# Patient Record
Sex: FEMALE | Race: WHITE | NOT HISPANIC OR LATINO | Employment: OTHER | URBAN - METROPOLITAN AREA
[De-identification: names, ages, dates, MRNs, and addresses within clinical notes are randomized per-mention and may not be internally consistent; named-entity substitution may affect disease eponyms.]

---

## 2017-04-11 ENCOUNTER — OFFICE VISIT (OUTPATIENT)
Dept: URGENT CARE | Facility: CLINIC | Age: 22
End: 2017-04-11
Payer: OTHER MISCELLANEOUS

## 2017-04-11 VITALS
HEART RATE: 102 BPM | OXYGEN SATURATION: 98 % | TEMPERATURE: 99.3 F | WEIGHT: 127 LBS | DIASTOLIC BLOOD PRESSURE: 78 MMHG | HEIGHT: 63 IN | RESPIRATION RATE: 14 BRPM | BODY MASS INDEX: 22.5 KG/M2 | SYSTOLIC BLOOD PRESSURE: 128 MMHG

## 2017-04-11 DIAGNOSIS — H73.012 BULLOUS MYRINGITIS OF LEFT EAR: ICD-10-CM

## 2017-04-11 PROCEDURE — 99214 OFFICE O/P EST MOD 30 MIN: CPT | Performed by: PHYSICIAN ASSISTANT

## 2017-04-11 RX ORDER — PREDNISONE 20 MG/1
TABLET ORAL
Qty: 3 TAB | Refills: 0 | Status: SHIPPED | OUTPATIENT
Start: 2017-04-11 | End: 2018-06-12

## 2017-04-11 RX ORDER — AMOXICILLIN AND CLAVULANATE POTASSIUM 875; 125 MG/1; MG/1
1 TABLET, FILM COATED ORAL 2 TIMES DAILY
Qty: 20 TAB | Refills: 0 | Status: SHIPPED | OUTPATIENT
Start: 2017-04-11 | End: 2017-04-21

## 2017-04-11 ASSESSMENT — ENCOUNTER SYMPTOMS
CARDIOVASCULAR NEGATIVE: 1
HEARTBURN: 0
NEUROLOGICAL NEGATIVE: 1
NAUSEA: 0
EYES NEGATIVE: 1
NECK PAIN: 0
RESPIRATORY NEGATIVE: 1
PSYCHIATRIC NEGATIVE: 1
VOMITING: 0

## 2017-04-11 NOTE — PATIENT INSTRUCTIONS
Start the prednisone today (steroid). It's best to take this in the morning.  While on prednisone avoid NSAIDs (aleve, ibuprofen, advil).  Tylenol is OK.   Start the Augmentin (antibiotic) and take morning and evening.  Start now and again tonight.  Increase yogurt and probiotics to avoid diarrhea and yeast infections.  Nasal saline irrigation (netti pot or Bharat Med Sinus Rinse).  Used distilled water or boiled tap water with nasal flushes, not straight tap water.  Humidifier at bedtime.  Hot steam showers to loosen up mucous.  Cough medicine at bedtime.  Lots of fluids, tea with honey.    Otitis Media, Adult  Otitis media is redness, soreness, and inflammation of the middle ear. Otitis media may be caused by allergies or, most commonly, by infection. Often it occurs as a complication of the common cold.  SIGNS AND SYMPTOMS  Symptoms of otitis media may include:  · Earache.  · Fever.  · Ringing in your ear.  · Headache.  · Leakage of fluid from the ear.  DIAGNOSIS  To diagnose otitis media, your health care provider will examine your ear with an otoscope. This is an instrument that allows your health care provider to see into your ear in order to examine your eardrum. Your health care provider also will ask you questions about your symptoms.  TREATMENT   Typically, otitis media resolves on its own within 3-5 days. Your health care provider may prescribe medicine to ease your symptoms of pain. If otitis media does not resolve within 5 days or is recurrent, your health care provider may prescribe antibiotic medicines if he or she suspects that a bacterial infection is the cause.  HOME CARE INSTRUCTIONS   · If you were prescribed an antibiotic medicine, finish it all even if you start to feel better.  · Take medicines only as directed by your health care provider.  · Keep all follow-up visits as directed by your health care provider.  SEEK MEDICAL CARE IF:  · You have otitis media only in one ear, or bleeding from your  nose, or both.  · You notice a lump on your neck.  · You are not getting better in 3-5 days.  · You feel worse instead of better.  SEEK IMMEDIATE MEDICAL CARE IF:   · You have pain that is not controlled with medicine.  · You have swelling, redness, or pain around your ear or stiffness in your neck.  · You notice that part of your face is paralyzed.  · You notice that the bone behind your ear (mastoid) is tender when you touch it.  MAKE SURE YOU:   · Understand these instructions.  · Will watch your condition.  · Will get help right away if you are not doing well or get worse.     This information is not intended to replace advice given to you by your health care provider. Make sure you discuss any questions you have with your health care provider.     Document Released: 09/22/2005 Document Revised: 01/08/2016 Document Reviewed: 07/15/2014  Elsevier Interactive Patient Education ©2016 Elsevier Inc.

## 2017-04-11 NOTE — Clinical Note
April 11, 2017         Patient: Lester Chávez   YOB: 1995   Date of Visit: 4/11/2017           To Whom it May Concern:    Lester Chávez was seen in my clinic on 4/11/2017. Please excuse her from classes and exams 4/11-4/13.    If you have any questions or concerns, please don't hesitate to call.        Sincerely,           Eric Villagomez PA-C  Electronically Signed

## 2017-04-11 NOTE — PROGRESS NOTES
Subjective:      Lester Chávez is a 21 y.o. female who presents with Congestion            HPI  Chief Complaint   Patient presents with   • Congestion     x 1 week/ mucus/and left ear plugged up/cough/stuffy nose       HPI:  Lester Chávez is a 21 y.o. female who presents with 1 week of worsening ear pain and congestion.  Left ear plugged up.  Has tried mucinex and sinus pills with sort of improvement.  Having dizziness with laying backwards.   Feeling uncomfortable.   Woke up last night with bad ear pain.  No fever or chills.  Green to yellow nose mucous.  No sick contacts.  Patient denies HA, SOB, chest pain, palpitations, fever, chills, or n/v/d.      No past medical history on file.    No past surgical history on file.    No family history on file.    Social History     Social History   • Marital Status: Unknown     Spouse Name: N/A   • Number of Children: N/A   • Years of Education: N/A     Occupational History   • Not on file.     Social History Main Topics   • Smoking status: Former Smoker   • Smokeless tobacco: Not on file   • Alcohol Use: Yes   • Drug Use: Not on file   • Sexual Activity: Not on file     Other Topics Concern   • Not on file     Social History Narrative         Current outpatient prescriptions:   •  amphetamine-dextroamphetamine, 30 mg, Oral, BID, 4/11/2017  •  Norethindrone Acet-Ethinyl Est (JUNEL 1.5/30 PO), Take  by mouth., 4/11/2017  •  amoxicillin, 875 mg, Oral, BID, not taking    No Known Allergies       Review of Systems   Constitutional: Positive for malaise/fatigue.   HENT: Positive for congestion and ear pain.    Eyes: Negative.    Respiratory: Negative.    Cardiovascular: Negative.    Gastrointestinal: Negative for heartburn, nausea and vomiting.   Genitourinary: Negative.    Musculoskeletal: Negative for neck pain.   Neurological: Negative.    Endo/Heme/Allergies: Negative.    Psychiatric/Behavioral: Negative.    All other systems reviewed and are negative.         Objective:  "    /78 mmHg  Pulse 102  Temp(Src) 37.4 °C (99.3 °F)  Resp 14  Ht 1.6 m (5' 2.99\")  Wt 57.607 kg (127 lb)  BMI 22.50 kg/m2  SpO2 98%  LMP 03/28/2017  Breastfeeding? No     Physical Exam       Nursing note and vitals reviewed.    Constitutional:   Appropriately groomed, pleasant affect, well nourished, in NAD.    Head:   Normocephalic, atraumatic.    Eyes:   PERRLA, EOM's full, sclera white, conjunctiva not erythematous, and medial canthus without exudate bilaterally.    Ears:  Tragus tender to manipulation.  No pre-auricular lymphadenopathy or mastoid ttp.  EACs with mild cerumen bilaterally, not erythematous.  Right TM bulging and injected with loss of anatomical landmarks with multiple bullae. Superior aspect of capillary hemorrhage.  Mucopurulent fluid present in the inner ear.  No apparent perforation.  Left TM pearly gray with cone of light present and umbo and malleolus visible.  No bulging or fluid bubbles present in middle ear.  Hearing grossly intact to voice.    Nose:  Nares bilaterally.  Nasal mucosa not edematous. Sinuses not tender to percussion bilaterally.    Throat:  Dentition wnl, mucosa moist without lesions.  Oropharynx mildly erythematous, with no enlargement of the palatine tonsils bilaterally with no exudates.    Post nasal drainage present.  Soft palate rises symmetrically bilaterally and uvula midline.      Neck: Neck supple, with mild anterior lymphadenopathy that is soft and mobile to palpation. Thyroid non-palpable without tenderness or nodules. No supraclavicular lymphadenopathy.    Lungs:  Respiratory effort not labored without accessory muscle use.  Lungs clear to auscultation bilaterally without wheezes or rales. Rhonchi clear to cough.    Heart:  RRR, without murmurs rubs or gallops.  Radial and dorsalis pedis pulse 2+ bilaterally.  No LE edema.    Musculoskeletal:  Gait non-antalgic with a narrow base.    Derm:  Skin without rashes or lesions with good turgor pressure. "      Psychiatric:  Mood, affect, and judgement appropriate.           Assessment/Plan:     1. Bullous myringitis of left ear  Patient presents with significant otitis media of the left air with bullous myringitis with bullae and capillary hemorrhage. No apparent perforation at this time. No mastoid tenderness to palpation. Anterior cervical chain lymphadenopathy palpation was coryza present bilaterally. Suspect viral upper respiratory infection on top of left bacterial ear infection. Prescribed Augmentin given the severity of symptoms and a prednisone pulse ×3 days. Recommended nasal saline irrigation and over-the-counter ear drop analgesic. Advised patient follow-up in 5-6 days for reevaluation of TM. School note provided.    Patient was in agreement with this treatment plan and seemed to understand without barriers. All questions were encouraged and answered.  Reviewed signs and symptoms of when to seek emergency medical care.     Please note that this dictation was created using voice recognition software.  I have made every reasonable attempt to correct obvious errors, but I expect there are errors of mayra and possibly content that I did not discover before finalizing the note.     - predniSONE (DELTASONE) 20 MG Tab; 1 tablet PO daily x 3 days.  Take with food in the morning.  Dispense: 3 Tab; Refill: 0  - amoxicillin-clavulanate (AUGMENTIN) 875-125 MG Tab; Take 1 Tab by mouth 2 times a day for 10 days.  Dispense: 20 Tab; Refill: 0

## 2017-04-21 ENCOUNTER — HOSPITAL ENCOUNTER (EMERGENCY)
Facility: MEDICAL CENTER | Age: 22
End: 2017-04-21
Attending: EMERGENCY MEDICINE
Payer: OTHER MISCELLANEOUS

## 2017-04-21 VITALS
WEIGHT: 125.66 LBS | HEIGHT: 63 IN | OXYGEN SATURATION: 98 % | TEMPERATURE: 98.1 F | BODY MASS INDEX: 22.27 KG/M2 | RESPIRATION RATE: 16 BRPM | HEART RATE: 99 BPM | DIASTOLIC BLOOD PRESSURE: 77 MMHG | SYSTOLIC BLOOD PRESSURE: 118 MMHG

## 2017-04-21 DIAGNOSIS — H67.9: ICD-10-CM

## 2017-04-21 PROCEDURE — A9270 NON-COVERED ITEM OR SERVICE: HCPCS | Performed by: EMERGENCY MEDICINE

## 2017-04-21 PROCEDURE — 700102 HCHG RX REV CODE 250 W/ 637 OVERRIDE(OP): Performed by: EMERGENCY MEDICINE

## 2017-04-21 PROCEDURE — 99283 EMERGENCY DEPT VISIT LOW MDM: CPT

## 2017-04-21 RX ORDER — AMOXICILLIN AND CLAVULANATE POTASSIUM 875; 125 MG/1; MG/1
1 TABLET, FILM COATED ORAL ONCE
Status: COMPLETED | OUTPATIENT
Start: 2017-04-21 | End: 2017-04-21

## 2017-04-21 RX ORDER — AMOXICILLIN AND CLAVULANATE POTASSIUM 875; 125 MG/1; MG/1
1 TABLET, FILM COATED ORAL 2 TIMES DAILY
Qty: 14 TAB | Refills: 0 | Status: SHIPPED | OUTPATIENT
Start: 2017-04-21 | End: 2018-06-12

## 2017-04-21 RX ORDER — DEXTROAMPHETAMINE SACCHARATE, AMPHETAMINE ASPARTATE, DEXTROAMPHETAMINE SULFATE AND AMPHETAMINE SULFATE 7.5; 7.5; 7.5; 7.5 MG/1; MG/1; MG/1; MG/1
30 TABLET ORAL DAILY
COMMUNITY
End: 2018-06-12

## 2017-04-21 RX ORDER — IBUPROFEN 800 MG/1
800 TABLET ORAL EVERY 8 HOURS PRN
Qty: 30 TAB | Refills: 0 | Status: SHIPPED | OUTPATIENT
Start: 2017-04-21 | End: 2018-06-12

## 2017-04-21 RX ORDER — IBUPROFEN 600 MG/1
600 TABLET ORAL ONCE
Status: COMPLETED | OUTPATIENT
Start: 2017-04-21 | End: 2017-04-21

## 2017-04-21 RX ADMIN — IBUPROFEN 600 MG: 600 TABLET, FILM COATED ORAL at 08:16

## 2017-04-21 RX ADMIN — AMOXICILLIN AND CLAVULANATE POTASSIUM 1 TABLET: 875; 125 TABLET, FILM COATED ORAL at 08:16

## 2017-04-21 ASSESSMENT — ENCOUNTER SYMPTOMS
FEVER: 0
CHILLS: 0

## 2017-04-21 ASSESSMENT — PAIN SCALES - GENERAL: PAINLEVEL_OUTOF10: 6

## 2017-04-21 NOTE — ED AVS SNAPSHOT
4/21/2017    [facility] [place] [address]    Dear [facility]:    [facility] wants to ensure your discharge home is safe and you or your loved ones have had all of your questions answered regarding your care after you leave the hospital.    Below is a list of resources and contact information should you have any questions regarding your hospital stay, follow-up instructions, or active medical symptoms.    Questions or Concerns Regarding… Contact   Medical Questions Related to Your Discharge  (7 days a week, 8am-5pm) Contact a Nurse Care Coordinator   [phone]   Medical Questions Not Related to Your Discharge  (24 hours a day / 7 days a week)  Contact the Nurse Health Line   [phone]    Medications or Discharge Instructions Refer to your discharge packet   or contact your [facility] Primary Care Provider   [phone]   Follow-up Appointment(s) Schedule your appointment via [employer]   or contact Scheduling [phone]   Billing Review your statement via [employer]  or contact Billing [phone]   Medical Records Review your records via [employer]   or contact Medical Records [phone]     You may receive a telephone call within two days of discharge. This call is to make certain you understand your discharge instructions and have the opportunity to have any questions answered. You can also easily access your medical information, test results and upcoming appointments via the [employer] free online health management tool. You can learn more and sign up at [employer]. For assistance setting up your [employer] account, please call [phone].    Once again, we want to ensure your discharge home is safe and that you have a clear understanding of any next steps in your care. If you have any questions or concerns, please do not hesitate to contact us, we are here for you. Thank you for choosing [facility] for your healthcare needs.    Sincerely,    Your [facility] Healthcare Team

## 2017-04-21 NOTE — ED AVS SNAPSHOT
Home Care Instructions                                                                                                                Lester Chávez   MRN: [id number]    Department:  [facility], Emergency Dept   Date of Visit:  4/21/2017            [facility], Emergency Dept    [address]    [place] [address]    Phone:  [phone]      You were seen by     Austin Lockett D.O.      Your Diagnosis Was     Otitis media in disease classified elsewhere     H67.9       Follow-up Information     1. Follow up with [facility].    Contact information    [address]  [address]  [place] [address] [phone]        Medication Information     Review all of your home medications and newly ordered medications with your primary doctor and/or pharmacist as soon as possible. Follow medication instructions as directed by your doctor and/or pharmacist.     Please keep your complete medication list with you and share with your physician. Update the information when medications are discontinued, doses are changed, or new medications (including over-the-counter products) are added; and carry medication information at all times in the event of emergency situations.               Medication List      START taking these medications        Instructions    Morning Afternoon Evening Bedtime    amoxicillin-clavulanate 875-125 MG Tabs   Commonly known as:  AUGMENTIN        Take 1 Tab by mouth 2 times a day.   Dose:  1 Tab                        ibuprofen 800 MG Tabs   Commonly known as:  MOTRIN        Take 1 Tab by mouth every 8 hours as needed.   Dose:  800 mg                          ASK your doctor about these medications        Instructions    Morning Afternoon Evening Bedtime    ADDERALL (30MG) 30 MG tablet   Generic drug:  amphetamine-dextroamphetamine        Take 30 mg by mouth every day.   Dose:  30 mg                        NON SPECIFIED        ORAL BIRTH CONTROL                                Where to Get Your Medications      You can get these medications from any pharmacy     Bring a paper prescription for each of these medications    - amoxicillin-clavulanate 875-125 MG Tabs  - ibuprofen 800 MG Tabs              Discharge Instructions       Otitis Media, Adult  Otitis media is redness, soreness, and inflammation of the middle ear. Otitis media may be caused by allergies or, most commonly, by infection. Often it occurs as a complication of the common cold.  SIGNS AND SYMPTOMS  Symptoms of otitis media may include:  · Earache.  · Fever.  · Ringing in your ear.  · Headache.  · Leakage of fluid from the ear.  DIAGNOSIS  To diagnose otitis media, your health care provider will examine your ear with an otoscope. This is an instrument that allows your health care provider to see into your ear in order to examine your eardrum. Your health care provider also will ask you questions about your symptoms.  TREATMENT   Typically, otitis media resolves on its own within 3-5 days. Your health care provider may prescribe medicine to ease your symptoms of pain. If otitis media does not resolve within 5 days or is recurrent, your health care provider may prescribe antibiotic medicines if he or she suspects that a bacterial infection is the cause.  HOME CARE INSTRUCTIONS   · If you were prescribed an antibiotic medicine, finish it all even if you start to feel better.  · Take medicines only as directed by your health care provider.  · Keep all follow-up visits as directed by your health care provider.  SEEK MEDICAL CARE IF:  · You have otitis media only in one ear, or bleeding from your nose, or both.  · You notice a lump on your neck.  · You are not getting better in 3-5 days.  · You feel worse instead of better.  SEEK IMMEDIATE MEDICAL CARE IF:   · You have pain that is not controlled with medicine.  · You have swelling, redness, or pain around your ear or stiffness in your neck.  · You notice that part of your face  is paralyzed.  · You notice that the bone behind your ear (mastoid) is tender when you touch it.  MAKE SURE YOU:   · Understand these instructions.  · Will watch your condition.  · Will get help right away if you are not doing well or get worse.     This information is not intended to replace advice given to you by your health care provider. Make sure you discuss any questions you have with your health care provider.     Document Released: 09/22/2005 Document Revised: 01/08/2016 Document Reviewed: 07/15/2014  ElseRIWI Interactive Patient Education ©2016 [employer] Inc.            Patient Information     Patient Information    Following emergency treatment: all patient requiring follow-up care must return either to a private physician or a clinic if your condition worsens before you are able to obtain further medical attention, please return to the emergency room.     Billing Information    At [facility], we work to make the billing process streamlined for our patients.  Our Representatives are here to answer any questions you may have regarding your hospital bill.  If you have insurance coverage and have supplied your insurance information to us, we will submit a claim to your insurer on your behalf.  Should you have any questions regarding your bill, we can be reached online or by phone as follows:  Online: You are able pay your bills online or live chat with our representatives about any billing questions you may have. We are here to help Monday - Friday from 8:00am to 7:30pm and 9:00am - 12:00pm on Saturdays.  Please visit [email or web address]  for more information.   Phone:  [phone] or [phone]    Please note that your emergency physician, surgeon, pathologist, radiologist, anesthesiologist, and other specialists are not employed by [facility] and will therefore bill separately for their services.  Please contact them directly for any questions concerning their bills at  the numbers below:     Emergency Physician Services:  [phone]  [facility]:  [phone]  [facility]:  [phone]  [facility]:  [phone]    1. Your final bill may vary from the amount quoted upon discharge if all procedures are not complete at that time, or if your doctor has additional procedures of which we are not aware. You will receive an additional bill if you return to the Emergency Department at [facility] for suture removal regardless of the facility of which the sutures were placed.     2. Please arrange for settlement of this account at the emergency registration.    3. All self-pay accounts are due in full at the time of treatment.  If you are unable to meet this obligation then payment is expected within 4-5 days.     4. If you have had radiology studies (CT, X-ray, Ultrasound, MRI), you have received a preliminary result during your emergency department visit. Please contact the radiology department [phone] to receive a copy of your final result. Please discuss the Final result with your primary physician or with the follow up physician provided.     Crisis Hotline:  [place] Crisis Hotline:  2-255-UEUTCTH or 1-754.560.8131  [place] Crisis Hotline:    1-181.639.7834 or 831-123-1673         ED Discharge Follow Up Questions    1. In order to provide you with very good care, we would like to follow up with a phone call in the next few days.  May we have your permission to contact you?     YES /  NO    2. What is the best phone number to call you? (       )_____-__________    3. What is the best time to call you?      Morning  /  Afternoon  /  Evening                   Patient Signature:  ____________________________________________________________    Date:  ____________________________________________________________

## 2017-04-21 NOTE — ED PROVIDER NOTES
"ED Provider Note    Scribed for Austin Lockett D.O. by Layla Beebe. 4/21/2017  7:58 AM    Primary care provider: No primary care provider  Means of arrival: Private vehicle  History obtained from: Patient  History limited by: None    CHIEF COMPLAINT  Chief Complaint   Patient presents with   • Ear Pain     rt   • Congestion     HPI  Lester Chávez is a 21 y.o. female who presents to the Emergency Department for right ear pain onset 4 hours prior to examination.  She states that the pain is very achy and 6/10 severity. The patient denies any fever or chills. The patient notes that she was seen at [facility] and treated for a left ear infection with a 10 day course of antibiotics which she just finished two days ago. The patient was also treated with a 3 day course of steroids for her congestion.  She notes that she has been feeling sick and congested for the last three weeks as well.     REVIEW OF SYSTEMS  Review of Systems   Constitutional: Negative for fever and chills.   HENT: Positive for congestion and ear pain (right).    E    PAST MEDICAL HISTORY    No chronic medical problems    SURGICAL HISTORY   has past surgical history that includes other cardiac surgery.    SOCIAL HISTORY  Social History   Substance Use Topics   • Smoking status: Never Smoker    • Smokeless tobacco: None   • Alcohol Use: No      History   Drug Use   • Yes     Comment: pot occ     FAMILY HISTORY  History reviewed. No pertinent family history.    CURRENT MEDICATIONS  Home Medications     Reviewed by Edgar Contreras R.N. (Registered Nurse) on 04/21/17 at 0723  Med List Status: Partial    Medication Last Dose Status    amphetamine-dextroamphetamine (ADDERALL, 30MG,) 30 MG tablet 4/20/2017 Active    NON SPECIFIED 4/20/2017 Active              ALLERGIES  No Known Allergies    PHYSICAL EXAM  VITAL SIGNS: /77 mmHg  Pulse 99  Temp(Src) 36.7 °C (98.1 °F)  Resp 16  Ht 1.6 m (5' 3\")  Wt 57 kg (125 lb 10.6 oz) " " BMI 22.27 kg/m2  SpO2 98%  LMP 03/31/2017    Constitutional: No distress. Well nourished.  HENT: Head is atraumatic. Oropharynx is moist. Right TM with erythema and bulging Left TM normal  Eyes: Conjunctivae are normal. EOMI.   Respiratory: No respiratory distress. Equal chest expansion.   Musculoskeletal: Normal range of motion. No edema.   Neurological: Alert. No focal deficits noted.    Skin: No rash. No Pallor.   Psych: Appropriate for clinical situation. Normal affect.    COURSE & MEDICAL DECISION MAKING  Nursing notes, VS, PMSFHx reviewed in chart.    7:58 AM - Patient seen and examined at bedside. Patient will be treated with Motrin 600mg tablet, Augmentin 875-125mg tablet.I discussed her above findings and plans for discharge with a prescription for 875-125mg, Motrin 800mg tablet. She was given a referral to follow up with her primary care provider and instructed to return to the ED if her symptoms worsen. She is nontoxic appearing, comfortable,and will follow up.  Patient understands and agrees. Her vitals prior to discharge are: /77 mmHg  Pulse 99  Temp(Src) 36.7 °C (98.1 °F)  Resp 16  Ht 1.6 m (5' 3\")  Wt 57 kg (125 lb 10.6 oz)  BMI 22.27 kg/m2  SpO2 98%  LMP 03/31/2017    The patient will return for new or worsening symptoms and is stable at the time of discharge.    DISPOSITION:  Patient will be discharged home in stable condition.    FOLLOW UP:  [facility]  [facility]  [place] [address]  [phone]            OUTPATIENT MEDICATIONS:  Discharge Medication List as of 4/21/2017  8:13 AM      START taking these medications    Details   amoxicillin-clavulanate (AUGMENTIN) 875-125 MG Tab Take 1 Tab by mouth 2 times a day., Disp-14 Tab, R-0, Print Rx Paper      ibuprofen (MOTRIN) 800 MG Tab Take 1 Tab by mouth every 8 hours as needed., Disp-30 Tab, R-0, Print Rx Paper             FINAL IMPRESSION  1. Otitis media in disease classified elsewhere          ILayla " Concepción (Scribe), am scribing for, and in the presence of, Austin Lockett D.O..    Electronically signed by: Layla Beebe ([email or web address]), 4/21/2017    I, Austin Lockett D.O. personally performed the services described in this documentation, as scribed by Layla Beebe in my presence, and it is both accurate and complete.    The note accurately reflects work and decisions made by me.  Austin Lockett  4/21/2017  2:13 PM

## 2017-04-21 NOTE — ED NOTES
Pt amb to triage.  Chief Complaint   Patient presents with   • Ear Pain     rt   • Congestion     Symptoms x3wks. Rt ear pain much worse since yesterday.

## 2017-04-21 NOTE — ED AVS SNAPSHOT
[employer] Access Code: [email or web address]  Expires: 5/21/2017  8:13 AM    Your email address is not on file at [facility].  Email Addresses are required for you to sign up for [employer], please contact [phone] to verify your personal information and to provide your email address prior to attempting to register for [employer] [address]    [employer]  A secure, online tool to manage your health information     [employer]’s [employer]® is a secure, online tool that connects you to your personalized health information from the privacy of your home -- day or night - making it very easy for you to manage your healthcare. Once the activation process is completed, you can even access your medical information using the [employer] june, which is available for free in the Apple June store or Google Play store.     To learn more about [employer], visit [email or web address]    There are two levels of access available (as shown below):   My Chart Features  [facility] Primary Care Doctor [facility]  Specialists [facility]  Urgent  Care Non-[facility] Primary Care Doctor   Email your healthcare team securely and privately 24/7 X X X    Manage appointments: schedule your next appointment; view details of past/upcoming appointments X      Request prescription refills. X      View recent personal medical records, including lab and immunizations X X X X   View health record, including health history, allergies, medications X X X X   Read reports about your outpatient visits, procedures, consult and ER notes X X X X   See your discharge summary, which is a recap of your hospital and/or ER visit that includes your diagnosis, lab results, and care plan X X  X     How to register for [employer]:  Once your e-mail address has been verified, follow the following steps to sign up for [employer].     1. Go to  [email or web address]  2. Click on the Sign Up Now box, which takes you to the New Member Sign Up page. You will need  to provide the following information:  a. Enter your [employer] Access Code exactly as it appears at the top of this page. (You will not need to use this code after you’ve completed the sign-up process. If you do not sign up before the expiration date, you must request a new code.)   b. Enter your date of birth.   c. Enter your home email address.   d. Click Submit, and follow the next screen’s instructions.  3. Create a [employer] ID. This will be your [employer] login ID and cannot be changed, so think of one that is secure and easy to remember.  4. Create a [employer] password. You can change your password at any time.  5. Enter your Password Reset Question and Answer. This can be used at a later time if you forget your password.   6. Enter your e-mail address. This allows you to receive e-mail notifications when new information is available in [employer].  7. Click Sign Up. You can now view your health information.    For assistance activating your [employer] account, call [phone]

## 2017-05-23 ENCOUNTER — OFFICE VISIT (OUTPATIENT)
Dept: URGENT CARE | Facility: CLINIC | Age: 22
End: 2017-05-23
Payer: OTHER MISCELLANEOUS

## 2017-05-23 VITALS
TEMPERATURE: 100 F | HEIGHT: 63 IN | DIASTOLIC BLOOD PRESSURE: 64 MMHG | SYSTOLIC BLOOD PRESSURE: 110 MMHG | HEART RATE: 108 BPM | RESPIRATION RATE: 16 BRPM | OXYGEN SATURATION: 99 % | BODY MASS INDEX: 22.86 KG/M2 | WEIGHT: 129 LBS

## 2017-05-23 DIAGNOSIS — H92.09 OTALGIA, UNSPECIFIED LATERALITY: ICD-10-CM

## 2017-05-23 DIAGNOSIS — B27.90 MONONUCLEOSIS: ICD-10-CM

## 2017-05-23 LAB
HETEROPH AB SER QL LA: POSITIVE
INT CON NEG: NEGATIVE
INT CON NEG: NEGATIVE
INT CON POS: POSITIVE
INT CON POS: POSITIVE
S PYO AG THROAT QL: NORMAL

## 2017-05-23 PROCEDURE — 87880 STREP A ASSAY W/OPTIC: CPT | Performed by: PHYSICIAN ASSISTANT

## 2017-05-23 PROCEDURE — 86308 HETEROPHILE ANTIBODY SCREEN: CPT | Performed by: PHYSICIAN ASSISTANT

## 2017-05-23 PROCEDURE — 99214 OFFICE O/P EST MOD 30 MIN: CPT | Performed by: PHYSICIAN ASSISTANT

## 2017-05-23 ASSESSMENT — ENCOUNTER SYMPTOMS
SORE THROAT: 1
ABDOMINAL PAIN: 0
FEVER: 1
SHORTNESS OF BREATH: 0
NAUSEA: 0
DIARRHEA: 0
SPUTUM PRODUCTION: 0
VOMITING: 0
WHEEZING: 0
COUGH: 1
CHILLS: 0

## 2017-05-23 NOTE — PROGRESS NOTES
Subjective:      Lester Chávez is a 21 y.o. female who presents with Sore Throat            Other  Associated symptoms include coughing, a fever and a sore throat. Pertinent negatives include no abdominal pain, chills, congestion, nausea, rash or vomiting.   last 2-3d of sorethroat and fever/chills - [id number], right ear pain, denies ear pain to left, last 2mos back to back AOM, denies naueas/vomiting, notes some mild abd cramp w/ menses but denies abd pain, denies rash/diarrhea, denies PMH of asthma/bronchitis/pneumonia, some seasonal allerg - has not tried any allerg tx's.. Mild dry cough, bad sorethroat.     Review of Systems   Constitutional: Positive for fever. Negative for chills.   HENT: Positive for ear pain and sore throat. Negative for congestion.    Respiratory: Positive for cough. Negative for sputum production, shortness of breath and wheezing.    Gastrointestinal: Negative for nausea, vomiting, abdominal pain and diarrhea.   Skin: Negative for rash.   Endo/Heme/Allergies: Positive for environmental allergies ( no tx).       PMH:  has no past medical history on file.  MEDS:   Current outpatient prescriptions:   •  amphetamine-dextroamphetamine (ADDERALL) 30 MG tablet, Take 30 mg by mouth 2 times a day., Disp: , Rfl:   •  Norethindrone Acet-Ethinyl Est (JUNEL 1.5/30 PO), Take  by mouth., Disp: , Rfl:   •  amphetamine-dextroamphetamine (ADDERALL, 30MG,) 30 MG tablet, Take 30 mg by mouth every day., Disp: , Rfl:   •  NON SPECIFIED, ORAL BIRTH CONTROL, Disp: , Rfl:   •  amoxicillin-clavulanate (AUGMENTIN) 875-125 MG Tab, Take 1 Tab by mouth 2 times a day., Disp: 14 Tab, Rfl: 0  •  ibuprofen (MOTRIN) 800 MG Tab, Take 1 Tab by mouth every 8 hours as needed., Disp: 30 Tab, Rfl: 0  •  predniSONE (DELTASONE) 20 MG Tab, 1 tablet PO daily x 3 days.  Take with food in the morning., Disp: 3 Tab, Rfl: 0  •  amoxicillin (AMOXIL) 875 MG tablet, Take 1 Tab by mouth 2 times a day., Disp: 20 Tab, Rfl: 0  ALLERGIES: No  "Known Allergies  SURGHX:   Past Surgical History   Procedure Laterality Date   • Other cardiac surgery       \"I had a heart ablation 4 or 5 years ago. when I was in high school.\"     SOCHX:  reports that she has never smoked. She does not have any smokeless tobacco history on file. She reports that she uses illicit drugs. She reports that she does not drink alcohol.  FH: Family history was reviewed, no pertinent findings to report    I have worn a mask for the entire encounter with this patient.      Objective:     /64 mmHg  Pulse 108  Temp(Src) 37.8 °C (100 °F)  Resp 16  Ht 1.6 m (5' 3\")  Wt 58.514 kg (129 lb)  BMI 22.86 kg/m2  SpO2 99%     Physical Exam   Constitutional: She is oriented to person, place, and time. She appears well-developed and well-nourished. No distress.   HENT:   Head: Normocephalic and atraumatic.   Right Ear: External ear and ear canal normal. Tympanic membrane is bulging ( ). Tympanic membrane is not erythematous.   Left Ear: External ear and ear canal normal. Tympanic membrane is bulging. Tympanic membrane is not erythematous.   Nose: Nose normal.   Mouth/Throat: Uvula is midline and mucous membranes are normal. Oropharyngeal exudate, posterior oropharyngeal edema and posterior oropharyngeal erythema present. No tonsillar abscesses.   Eyes: Conjunctivae and lids are normal. Right eye exhibits no discharge. Left eye exhibits no discharge. No scleral icterus.   Neck: Neck supple.   Pulmonary/Chest: Effort normal and breath sounds normal. No respiratory distress. She has no decreased breath sounds. She has no wheezes. She has no rhonchi. She has no rales.   Abdominal: Soft. Bowel sounds are normal. She exhibits no distension. There is no tenderness. There is no rebound and no guarding.   Musculoskeletal: Normal range of motion.   Lymphadenopathy:     She has cervical adenopathy ( moderate bilat).   Neurological: She is alert and oriented to person, place, and time. She is not " disoriented.   Skin: Skin is warm and dry. She is not diaphoretic. No erythema. No pallor.   Psychiatric: Her speech is normal and behavior is normal.   Nursing note and vitals reviewed.       POCT strep - NEG  POCT mono - POS       Assessment/Plan:   1. Mononucleosis  Supportive care is reviewed with patient/caregiver - recommend to push PO fluids and electrolytes, typical course of dz discussed w/ pt, cautioned to avoid torso/abd trauma, fever/reducers and throat analgesics discussed  Return to clinic with lack of resolution or progression of symptoms.  Sent w/ info on dx    - POCT Mononucleosis (mono)    2. Otalgia, unspecified laterality  - POCT Rapid Strep A  - lidocaine viscous 2% (XYLOCAINE) 2 % Solution; Take 5 mL by mouth as needed for Throat/Mouth Pain (q6hr PRN throat pain, ok to rinse and spit or swallow).  Dispense: 120 mL; Refill: 0

## 2017-05-23 NOTE — MR AVS SNAPSHOT
"        Lester Chávez   2017 10:30 AM   Office Visit   MRN: [id number]    Department:  [facility]   Dept Phone:  [phone]    Description:  Female : 1995   Provider:  Trey Austin PA-C           Reason for Visit     Sore Throat x 2 days with ear pain      Allergies as of 2017     No Known Allergies      You were diagnosed with     Mononucleosis   [901544]       Otalgia, unspecified laterality   [173728]         Vital Signs     Blood Pressure Pulse Temperature Respirations Height Weight    110/64 mmHg 108 37.8 °C (100 °F) 16 1.6 m (5' 3\") 58.514 kg (129 lb)    Body Mass Index Oxygen Saturation Smoking Status             22.86 kg/m2 99% Never Smoker          Basic Information     Date Of Birth Sex Race Ethnicity Preferred Language    1995 Female White Non- English      Health Maintenance        Date Due Completion Dates    IMM HEP B VACCINE (1 of 3 - Primary Series) 1995 ---    IMM HEP A VACCINE (1 of 2 - Standard Series) 1996 ---    IMM HPV VACCINE (1 of 3 - Female 3 Dose Series) 2006 ---    IMM VARICELLA (CHICKENPOX) VACCINE (1 of 2 - 2 Dose Adolescent Series) 2008 ---    IMM MENINGOCOCCAL VACCINE (MCV4) (1 of 1) 2011 ---    IMM DTaP/Tdap/Td Vaccine (1 - Tdap) 2014 ---    PAP SMEAR 2016 ---            Results     POCT Rapid Strep A      Component    Rapid Strep Screen    neg    Internal Control Positive    Positive    Internal Control Negative    Negative                        Current Immunizations     No immunizations on file.      Below and/or attached are the medications your provider expects you to take. Review all of your home medications and newly ordered medications with your provider and/or pharmacist. Follow medication instructions as directed by your provider and/or pharmacist. Please keep your medication list with you and share with your provider. Update the information when medications are discontinued, doses are changed, or " new medications (including over-the-counter products) are added; and carry medication information at all times in the event of emergency situations     Allergies:  No Known Allergies          Medications  Valid as of: May 23, 2017 - 11:17 AM    Generic Name Brand Name Tablet Size Instructions for use    Amoxicillin (Tab) AMOXIL 875 MG Take 1 Tab by mouth 2 times a day.        Amoxicillin-Pot Clavulanate (Tab) AUGMENTIN 875-125 MG Take 1 Tab by mouth 2 times a day.        Amphetamine-Dextroamphetamine (Tab) ADDERALL 30 MG Take 30 mg by mouth 2 times a day.        Amphetamine-Dextroamphetamine (Tab) ADDERALL 30 MG Take 30 mg by mouth every day.        Ibuprofen (Tab) MOTRIN 800 MG Take 1 Tab by mouth every 8 hours as needed.        Lidocaine HCl (Solution) XYLOCAINE 2 % Take 5 mL by mouth as needed for Throat/Mouth Pain (q6hr PRN throat pain, ok to rinse and spit or swallow).        NON SPECIFIED   ORAL BIRTH CONTROL        Norethindrone Acet-Ethinyl Est   Take  by mouth.        PredniSONE (Tab) DELTASONE 20 MG 1 tablet PO daily x 3 days.  Take with food in the morning.        .                 Medicines prescribed today were sent to:     [facility] [address] [place] [address] - [address] [facility]    [address] [facility] [address]    Phone: [phone] Fax: [phone]    Open 24 Hours?: Yes      Medication refill instructions:       If your prescription bottle indicates you have medication refills left, it is not necessary to call your provider’s office. Please contact your pharmacy and they will refill your medication.    If your prescription bottle indicates you do not have any refills left, you may request refills at any time through one of the following ways: The online [employer] system (except Urgent Care), by calling your provider’s office, or by asking your pharmacy to contact your provider’s office with a refill request. Medication refills are processed only during regular  business hours and may not be available until the next business day. Your provider may request additional information or to have a follow-up visit with you prior to refilling your medication.   *Please Note: Medication refills are assigned a new Rx number when refilled electronically. Your pharmacy may indicate that no refills were authorized even though a new prescription for the same medication is available at the pharmacy. Please request the medicine by name with the pharmacy before contacting your provider for a refill.           MyChart Status: Patient Declined

## 2017-08-02 ENCOUNTER — APPOINTMENT (OUTPATIENT)
Dept: RADIOLOGY | Facility: IMAGING CENTER | Age: 22
End: 2017-08-02
Attending: PHYSICIAN ASSISTANT
Payer: OTHER MISCELLANEOUS

## 2017-08-02 ENCOUNTER — OFFICE VISIT (OUTPATIENT)
Dept: URGENT CARE | Facility: CLINIC | Age: 22
End: 2017-08-02
Payer: OTHER MISCELLANEOUS

## 2017-08-02 VITALS
BODY MASS INDEX: 22.15 KG/M2 | OXYGEN SATURATION: 97 % | HEIGHT: 63 IN | TEMPERATURE: 99.4 F | SYSTOLIC BLOOD PRESSURE: 108 MMHG | WEIGHT: 125 LBS | HEART RATE: 77 BPM | DIASTOLIC BLOOD PRESSURE: 80 MMHG

## 2017-08-02 DIAGNOSIS — M79.672 LEFT FOOT PAIN: ICD-10-CM
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
## 2017-08-02 PROCEDURE — 99214 OFFICE O/P EST MOD 30 MIN: CPT | Performed by: PHYSICIAN ASSISTANT

## 2017-08-02 PROCEDURE — 73630 X-RAY EXAM OF FOOT: CPT | Mod: TC,LT | Performed by: PHYSICIAN ASSISTANT

## 2017-08-02 RX ORDER — NAPROXEN 500 MG/1
500 TABLET ORAL 2 TIMES DAILY WITH MEALS
Qty: 24 TAB | Refills: 0 | Status: SHIPPED | OUTPATIENT
Start: 2017-08-02 | End: 2018-06-12

## 2017-08-02 ASSESSMENT — ENCOUNTER SYMPTOMS
TINGLING: 0
CONSTITUTIONAL NEGATIVE: 1
LOSS OF MOTION: 0
INABILITY TO BEAR WEIGHT: 0
MUSCLE WEAKNESS: 1
FOCAL WEAKNESS: 1
NUMBNESS: 0
LOSS OF SENSATION: 0
SENSORY CHANGE: 0

## 2017-08-02 NOTE — MR AVS SNAPSHOT
"        Lester Chávez   2017 5:30 PM   Office Visit   MRN: [id number]    Department:  [facility]   Dept Phone:  [phone]    Description:  Female : 1995   Provider:  Segundo Varela PA-C           Reason for Visit     Ankle Injury Ongoing Ankle pain, pain in top of foot that shoots up ankle       Allergies as of 2017     No Known Allergies      You were diagnosed with     Left foot pain   [[id number]]       Repetitive strain injury syndrome   [[id number]]         Vital Signs     Blood Pressure Pulse Temperature Height Weight Body Mass Index    108/80 mmHg 77 37.4 °C (99.4 °F) 1.6 m (5' 2.99\") 56.7 kg (125 lb) 22.15 kg/m2    Oxygen Saturation Smoking Status                97% Never Smoker           Basic Information     Date Of Birth Sex Race Ethnicity Preferred Language    1995 Female White Non- English      Health Maintenance        Date Due Completion Dates    IMM HEP B VACCINE (1 of 3 - Primary Series) 1995 ---    IMM HEP A VACCINE (1 of 2 - Standard Series) 1996 ---    IMM HPV VACCINE (1 of 3 - Female 3 Dose Series) 2006 ---    IMM VARICELLA (CHICKENPOX) VACCINE (1 of 2 - 2 Dose Adolescent Series) 2008 ---    IMM DTaP/Tdap/Td Vaccine (1 - Tdap) 2014 ---    PAP SMEAR 2016 ---    IMM INFLUENZA (1) 2017 ---            Current Immunizations     No immunizations on file.      Below and/or attached are the medications your provider expects you to take. Review all of your home medications and newly ordered medications with your provider and/or pharmacist. Follow medication instructions as directed by your provider and/or pharmacist. Please keep your medication list with you and share with your provider. Update the information when medications are discontinued, doses are changed, or new medications (including over-the-counter products) are added; and carry medication information at all times in the event of emergency situations     Allergies:  No Known " Allergies          Medications  Valid as of: August 02, 2017 -  6:25 PM    Generic Name Brand Name Tablet Size Instructions for use    Amoxicillin (Tab) AMOXIL 875 MG Take 1 Tab by mouth 2 times a day.        Amoxicillin-Pot Clavulanate (Tab) AUGMENTIN 875-125 MG Take 1 Tab by mouth 2 times a day.        Amphetamine-Dextroamphetamine (Tab) ADDERALL 30 MG Take 30 mg by mouth 2 times a day.        Amphetamine-Dextroamphetamine (Tab) ADDERALL 30 MG Take 30 mg by mouth every day.        Ibuprofen (Tab) MOTRIN 800 MG Take 1 Tab by mouth every 8 hours as needed.        Lidocaine HCl (Solution) XYLOCAINE 2 % Take 5 mL by mouth as needed for Throat/Mouth Pain (q6hr PRN throat pain, ok to rinse and spit or swallow).        Naproxen (Tab) NAPROSYN 500 MG Take 1 Tab by mouth 2 times a day, with meals.        NON SPECIFIED   ORAL BIRTH CONTROL        Norethindrone Acet-Ethinyl Est   Take  by mouth.        PredniSONE (Tab) DELTASONE 20 MG 1 tablet PO daily x 3 days.  Take with food in the morning.        .                 Medicines prescribed today were sent to:     [email or web address] [address] [place] [address] - [address] [facility]    [address] [facility] [address]    Phone: [phone] Fax: [phone]    Open 24 Hours?: Yes      Medication refill instructions:       If your prescription bottle indicates you have medication refills left, it is not necessary to call your provider’s office. Please contact your pharmacy and they will refill your medication.    If your prescription bottle indicates you do not have any refills left, you may request refills at any time through one of the following ways: The online [employer] system (except Urgent Care), by calling your provider’s office, or by asking your pharmacy to contact your provider’s office with a refill request. Medication refills are processed only during regular business hours and may not be available until the next business day. Your provider may  request additional information or to have a follow-up visit with you prior to refilling your medication.   *Please Note: Medication refills are assigned a new Rx number when refilled electronically. Your pharmacy may indicate that no refills were authorized even though a new prescription for the same medication is available at the pharmacy. Please request the medicine by name with the pharmacy before contacting your provider for a refill.        Your To Do List     Future Labs/Procedures Complete By Expires    DX-FOOT-COMPLETE 3+ LEFT  8/2/2017 8/2/2018      Referral     A referral request has been sent to our patient care coordination department. Please allow 3-5 business days for us to process this request and contact you either by phone or mail. If you do not hear from us by the 5th business day, please call us at [phone].           [employer] Access Code: Activation code not generated  Current MyCheck Status: Active

## 2017-08-03 NOTE — PROGRESS NOTES
"Subjective:      Lester Chávez is a 22 y.o. female who presents with Ankle Injury            Ankle Injury   The incident occurred 3 to 5 days ago (dancing; repet.strain , pain distal foot). The incident occurred at home. The injury mechanism was an inversion injury. The pain is present in the left ankle. The quality of the pain is described as aching. The pain is mild. The pain has been constant since onset. Associated symptoms include muscle weakness. Pertinent negatives include no inability to bear weight, loss of motion, loss of sensation, numbness or tingling. She reports no foreign bodies present. The symptoms are aggravated by movement, palpation and weight bearing. She has tried nothing for the symptoms. The treatment provided mild relief.       Review of Systems   Constitutional: Negative.    Musculoskeletal: Positive for joint pain.   Skin: Negative.    Neurological: Positive for focal weakness. Negative for tingling, sensory change and numbness.          Objective:     /80 mmHg  Pulse 77  Temp(Src) 37.4 °C (99.4 °F)  Ht 1.6 m (5' 2.99\")  Wt 56.7 kg (125 lb)  BMI 22.15 kg/m2  SpO2 97%     Physical Exam   Constitutional: She is oriented to person, place, and time. She appears well-developed and well-nourished. No distress.   Musculoskeletal: She exhibits tenderness (ttp distal 3rd MT, ball of foot; nl rom). She exhibits no edema.        Left foot: There is tenderness and bony tenderness. There is normal range of motion, no swelling and no deformity.        Feet:    Neurological: She is alert and oriented to person, place, and time. No sensory deficit. She exhibits abnormal muscle tone. Coordination normal.   Skin: Skin is warm and dry. No erythema.   Psychiatric: She has a normal mood and affect. Her behavior is normal. Judgment and thought content normal.   Nursing note and vitals reviewed.    Filed Vitals:    08/02/17 1730   BP: 108/80   Pulse: 77   Temp: 37.4 °C (99.4 °F)   Height: 1.6 m (5' " "2.99\")   Weight: 56.7 kg (125 lb)   SpO2: 97%     Active Ambulatory Problems     Diagnosis Date Noted   • No Active Ambulatory Problems     Resolved Ambulatory Problems     Diagnosis Date Noted   • No Resolved Ambulatory Problems     No Additional Past Medical History     Current Outpatient Prescriptions on File Prior to Visit   Medication Sig Dispense Refill   • amphetamine-dextroamphetamine (ADDERALL) 30 MG tablet Take 30 mg by mouth 2 times a day.     • Norethindrone Acet-Ethinyl Est (JUNEL 1.5/30 PO) Take  by mouth.     • lidocaine viscous 2% (XYLOCAINE) 2 % Solution Take 5 mL by mouth as needed for Throat/Mouth Pain (q6hr PRN throat pain, ok to rinse and spit or swallow). 120 mL 0   • amphetamine-dextroamphetamine (ADDERALL, 30MG,) 30 MG tablet Take 30 mg by mouth every day.     • NON SPECIFIED ORAL BIRTH CONTROL     • amoxicillin-clavulanate (AUGMENTIN) 875-125 MG Tab Take 1 Tab by mouth 2 times a day. 14 Tab 0   • ibuprofen (MOTRIN) 800 MG Tab Take 1 Tab by mouth every 8 hours as needed. 30 Tab 0   • predniSONE (DELTASONE) 20 MG Tab 1 tablet PO daily x 3 days.  Take with food in the morning. 3 Tab 0   • amoxicillin (AMOXIL) 875 MG tablet Take 1 Tab by mouth 2 times a day. 20 Tab 0     No current facility-administered medications on file prior to visit.     Gargles, Cepacol lozenges, Aleve/Advil as needed for throat pain  History reviewed. No pertinent family history.  Review of patient's allergies indicates no known allergies.         Xr= ng  (read/interpret. By me. Rw)       Assessment/Plan:     ·  L foot pain, RSS      · RICE, nsaids, Pod refer.      "

## 2018-06-12 ENCOUNTER — OFFICE VISIT (OUTPATIENT)
Dept: MEDICAL GROUP | Age: 23
End: 2018-06-12
Payer: COMMERCIAL

## 2018-06-12 VITALS
DIASTOLIC BLOOD PRESSURE: 78 MMHG | SYSTOLIC BLOOD PRESSURE: 120 MMHG | BODY MASS INDEX: 22.43 KG/M2 | OXYGEN SATURATION: 100 % | HEIGHT: 63 IN | WEIGHT: 126.6 LBS | RESPIRATION RATE: 12 BRPM | TEMPERATURE: 98.4 F | HEART RATE: 91 BPM

## 2018-06-12 DIAGNOSIS — Z30.41 ENCOUNTER FOR SURVEILLANCE OF CONTRACEPTIVE PILLS: ICD-10-CM

## 2018-06-12 DIAGNOSIS — F90.2 ATTENTION DEFICIT HYPERACTIVITY DISORDER (ADHD), COMBINED TYPE: ICD-10-CM

## 2018-06-12 PROCEDURE — 99214 OFFICE O/P EST MOD 30 MIN: CPT | Performed by: INTERNAL MEDICINE

## 2018-06-12 RX ORDER — NORETHINDRONE ACETATE AND ETHINYL ESTRADIOL AND FERROUS FUMARATE 1MG-20(21)
KIT ORAL
Refills: 11 | COMMUNITY
Start: 2018-05-10 | End: 2018-06-12 | Stop reason: SDUPTHER

## 2018-06-12 RX ORDER — NORETHINDRONE ACETATE AND ETHINYL ESTRADIOL AND FERROUS FUMARATE 1MG-20(21)
1 KIT ORAL DAILY
Qty: 28 TAB | Refills: 11 | Status: SHIPPED | OUTPATIENT
Start: 2018-06-12

## 2018-06-12 RX ORDER — DEXTROAMPHETAMINE SACCHARATE, AMPHETAMINE ASPARTATE MONOHYDRATE, DEXTROAMPHETAMINE SULFATE AND AMPHETAMINE SULFATE 7.5; 7.5; 7.5; 7.5 MG/1; MG/1; MG/1; MG/1
CAPSULE, EXTENDED RELEASE ORAL
Refills: 0 | COMMUNITY
Start: 2018-05-22 | End: 2018-06-12 | Stop reason: SDUPTHER

## 2018-06-12 RX ORDER — DEXTROAMPHETAMINE SACCHARATE, AMPHETAMINE ASPARTATE MONOHYDRATE, DEXTROAMPHETAMINE SULFATE AND AMPHETAMINE SULFATE 7.5; 7.5; 7.5; 7.5 MG/1; MG/1; MG/1; MG/1
30 CAPSULE, EXTENDED RELEASE ORAL EVERY MORNING
Qty: 30 CAP | Refills: 0 | Status: SHIPPED | OUTPATIENT
Start: 2018-06-12 | End: 2018-07-12

## 2018-06-12 ASSESSMENT — PATIENT HEALTH QUESTIONNAIRE - PHQ9: CLINICAL INTERPRETATION OF PHQ2 SCORE: 0

## 2018-06-12 NOTE — ASSESSMENT & PLAN NOTE
Patient states that she was diagnosed with ADHD since childhood.  She was treated with medication for ADHD since her primary school.  She started taking Adderall 3 years ago.  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  She is currently taking Adderall XR 30 mg once a day and Adderall short acting 10 mg daily.  Patient requested to stop taking short acting Adderall and continue Adderall XR 30 mg currently.  Patient states that she understands the risks of taking Adderall and she agrees to taper down in the future.  Patient also agreed to follow with behavioral health to manage her ADHD.

## 2018-06-12 NOTE — PROGRESS NOTES
Lester Chávez is a 23 y.o. female here to establish care and the evaluation and management of:      HPI:    Encounter for surveillance of contraceptive pills  Patient is a new patient to [facility].  Patient states that she is taking Junel oral contraceptive pill 1 tablet daily. She denies side effects from taking it.  She has light menstrual cycle once a month.  She is sexually active with one sexual partner with her boyfriend for 2 years.  Patient stated that she completed HPV vaccine in her high school.  Patient has never had Pap smear done yet.  She agreed to do Pap smear.  Patient never smoked.  She denies family history of breast cancer or blood clot.    Attention deficit hyperactivity disorder (ADHD), combined type  Patient states that she was diagnosed with ADHD since childhood.  She was treated with medication for ADHD since her primary school.  She started taking Adderall 3 years ago.  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  She is currently taking Adderall XR 30 mg once a day and Adderall short acting 10 mg daily.  Patient requested to stop taking short acting Adderall and continue Adderall XR 30 mg currently.  Patient states that she understands the risks of taking Adderall and she agrees to taper down in the future.  Patient also agreed to follow with behavioral health to manage her ADHD.    Current medicines (including changes today)  Current Outpatient Prescriptions   Medication Sig Dispense Refill   • amphetamine-dextroamphetamine ER (ADDERALL XR) 30 MG XR capsule Take 1 Cap by mouth every morning for 30 days. May refill on or after 6/22/18 30 Cap 0   • JUNEL FE 1/20 1-20 MG-MCG per tablet Take 1 Tab by mouth every day. 28 Tab 11     No current facility-administered medications for this visit.      She  has a past medical history of SVT (supraventricular tachycardia) ([facility]).  She  has a past surgical history that includes other  "cardiac surgery.  Social History   Substance Use Topics   • Smoking status: Never Smoker   • Smokeless tobacco: Never Used   • Alcohol use No     Social History     Social History Narrative    ** Merged History Encounter **          Family History   Problem Relation Age of Onset   • Heart Disease Mother      vavular disease    • No Known Problems Maternal Grandmother    • No Known Problems Maternal Grandfather    • No Known Problems Paternal Grandmother    • No Known Problems Paternal Grandfather      Family Status   Relation Status   • Mother Alive   • Father Alive   • Maternal Grandmother Alive   • Maternal Grandfather Alive   • Paternal Grandmother Alive   • Paternal Grandfather Alive     Health Maintenance Topics with due status: Overdue       Topic Date Due    IMM HEP B VACCINE 1995    IMM HEP A VACCINE 05/31/1996    IMM MENINGOCOCCAL B VACCINE 05/31/2005    IMM HPV VACCINE 05/31/2006    IMM VARICELLA (CHICKENPOX) VACCINE 05/31/2008    CHLAMYDIA SCREENING 05/31/2011    IMM DTaP/Tdap/Td Vaccine 05/31/2014    PAP SMEAR 05/31/2016         ROS    Gen.: Denied weight change, appetite change, fatigue.  ENT: Denied sinus tenderness, nasal congestion, runny nose, or sore throat  CVS: Denied chest pain, palpitations, legs swelling.  Respiratory: Denied cough, shortness of breath, wheezing.  GI: Denied abdominal pain, constipation or diarrhea.  Endocrine: Denied temperature intolerance, increased frequency of urination, polyphagia or polydipsia.  Musculoskeletal: Denied back pain or joint pain.    All other systems reviewed and are negative     Objective:     Blood pressure 120/78, pulse 91, temperature 36.9 °C (98.4 °F), resp. rate 12, height 1.6 m (5' 3\"), weight 57.4 kg (126 lb 9.6 oz), SpO2 100 %. Body mass index is 22.43 kg/m².  Physical Exam:    Constitutional: Well nourished and Well developed, Alert, no distress.  Skin: Warm, dry, good turgor, no rashes in visible areas.  Eye: Equal, round and reactive, " conjunctiva clear, lids normal.  ENMT: Lips without lesions, good dentition, oropharynx clear.  Neck: Trachea midline, no masses, no thyromegaly. No cervical or supraclavicular lymphadenopathy.  Respiratory: Unlabored respiratory effort, lungs clear to auscultation, no wheezes, no ronchi.  Cardiovascular: Normal S1, S2, no murmur, no edema.   Abdomen: Soft, non distended, non-tender, no masses, no hepatosplenomegaly. Bowel sound normal.  Extremities: No edema, no clubbing, no cyanosis.  Psych: Alert and oriented x3, normal affect and mood.        Assessment and Plan:   The following treatment plan was discussed       1. Encounter for surveillance of contraceptive pills  - Discussed the risks and benefits of oral contraceptive pill with patient.  Counseling to avoid smoking once she is taking oral contraceptive pill.  Refilled Junel today.    - Counseled for safe sex practice and complying with condoms to prevent STD.  - Patient never has Pap smear in her life. Counseling for Pap smear and chlamydia screening.  Patient stated that she will call to schedule for Pap smear.  - JUNEL FE 1/20 1-20 MG-MCG per tablet; Take 1 Tab by mouth every day.  Dispense: 28 Tab; Refill: 11    2. Attention deficit hyperactivity disorder (ADHD), combined type  - Reviewed the risks and benefits of Adderall XR and potential side effects of Adderall XR with patient.  - Encourage patient to wean down medication.   - Discussed to avoid using marijuana or any recreational drugs.  Patient denied drinking alcohol.  - Patient states that she has history of SVT at age 15 and was treated with cardiac ablation.  She did not have recurrent symptoms.  I strongly recommend her to stop taking Adderall in future and she agreed to discuss with behavioral health for that.  - amphetamine-dextroamphetamine ER (ADDERALL XR) 30 MG XR capsule; Take 1 Cap by mouth every morning for 30 days. May refill on or after 6/22/18  Dispense: 30 Cap; Refill: 0  - REFERRAL  TO BEHAVIORAL HEALTH        Records requested.  Followup: Return in about 3 months (around 9/12/2018), or if symptoms worsen or fail to improve, for Pap. sooner should new symptoms or problems arise.      Please note that this dictation was created using voice recognition software. I have made every reasonable attempt to correct obvious errors, but I expect that there may have unintended errors in text, spelling, punctuation, or grammar that I did not discover.

## 2018-06-12 NOTE — ASSESSMENT & PLAN NOTE
Patient is a new patient to [facility] primary care.  Patient states that she is taking Junel oral contraceptive pill 1 tablet daily. She denies side effects from taking it.  She has light menstrual cycle once a month.  She is sexually active with one sexual partner with her boyfriend for 2 years.  Patient stated that she completed HPV vaccine in her high school.  Patient has never had Pap smear done yet.  She agreed to do Pap smear.  Patient never smoked.  She denies family history of breast cancer or blood clot.

## 2018-06-25 ENCOUNTER — TELEPHONE (OUTPATIENT)
Dept: MEDICAL GROUP | Age: 23
End: 2018-06-25

## 2018-06-25 DIAGNOSIS — F90.2 ATTENTION DEFICIT HYPERACTIVITY DISORDER (ADHD), COMBINED TYPE: ICD-10-CM

## 2018-06-25 RX ORDER — METHYLPHENIDATE HYDROCHLORIDE 10 MG/1
10 TABLET ORAL 2 TIMES DAILY
Qty: 60 TAB | Refills: 0 | Status: SHIPPED
Start: 2018-06-25 | End: 2018-07-19

## 2018-06-25 NOTE — TELEPHONE ENCOUNTER
1. Caller Name: Stacia                                         Call Back Number: [phone]       Pt insurance don't cover D-Amphetamine ER 30mg salt combo. Wondering if you can switch medication to something else

## 2018-06-25 NOTE — LETTER
June 29, 2018        Lester Chávez  [address] [place] [address] [place] [address]        Dear Lester:    We have been unsuccessful trying to reach you to discuss your medications. We recived a fax from your pharmacy that your insurance doesn't cover Amphetamine ER 30mg so Dr. Calixto switched Adderall ER (amphetamine-dextroamphetamine) to methylphenidate 10 mg twice daily. Since those medications for ADHD can cause dependency and they are mostly expensive and may not fully covered by insurance.  If you can stop taking the medication, you are not required to start methylphenidate. Otherwise, you can try a new medication, methylphenidate 10 mg twice a day.  Please do not crush the medication.  Dr. Calixto strongly recommends to follow-up with psychiatrist to discuss and treat for ADHD.                 Thank You!        Ronnie Lam Ass't      Electronically Signed

## 2018-06-26 NOTE — TELEPHONE ENCOUNTER
I switched Adderall ER (amphetamine-dextroamphetamine) to methylphenidate 10 mg twice daily.  All those medication for ADHD can cause dependency and they are mostly expensive and may not fully covered by insurance.    If she can stop taking medication, she does not require to start methylphenidate.  Otherwise, she can try a new medication, methylphenidate 10 mg twice a day.  Please advise patient do not crush medication.  I will strongly recommend patient to follow-up with psychiatrist to discuss and treat for ADHD.    Shannon Calixto M.D.

## 2018-06-27 NOTE — TELEPHONE ENCOUNTER
Phone Number Called: [phone] (home)       Message: LVM for patient for message below.    Left Message for patient to call back: yes

## 2018-06-29 NOTE — TELEPHONE ENCOUNTER
Phone Number Called: [phone] (home)       Message: Left voicemail to patient regarding Dr. Calixto's message was unable to leave voicemail, voicemail is full. Sending a letter    Left Message for patient to call back: N\A

## 2018-07-02 ENCOUNTER — TELEPHONE (OUTPATIENT)
Dept: MEDICAL GROUP | Age: 23
End: 2018-07-02

## 2018-07-02 NOTE — TELEPHONE ENCOUNTER
1. Caller Name: Lester Chávez                                           Call Back Number: [phone] (home)         Patient approves a detailed voicemail message: no    Patient called explaining that although she agreed to ween off the adderall, the methlyphenidate is not making her feel well. She cannot get in to see her specialist until September.  Appointment scheduled for 07/19/2018

## 2018-07-03 ENCOUNTER — TELEPHONE (OUTPATIENT)
Dept: MEDICAL GROUP | Age: 23
End: 2018-07-03

## 2018-07-03 NOTE — TELEPHONE ENCOUNTER
Phone Number Called: [phone] (home)       Message: Informed patient that adderral Rx should already be at the pharmacy. Pharmacy discontinued RITALIN.    Left Message for patient to call back: no

## 2018-07-03 NOTE — TELEPHONE ENCOUNTER
1. Caller Name: Lester Chávez                                           Call Back Number: [phone] (home)         Patient approves a detailed voicemail message: yes    Patient called to say that she talked to her insurance and does not mind paying the $55 co-pay for the generic.

## 2018-07-03 NOTE — TELEPHONE ENCOUNTER
Patient already has generic prescription of Adderall XR 30 mg prescribed and printed with prescription paper in office visit on 6/12/18. She can use the prescription and submit to pharmacy.    Thanks!  Shannon Calixto M.D.

## 2018-07-03 NOTE — TELEPHONE ENCOUNTER
She can take Adderall which I prescribed in previous visit on 6/12/18.     But I cannot change the price for Adderall as insurance may not cover it or the copay is high for Adderall.    So I switched to alternate medicine, methylphenidate on 6/25/18 due to the cost of Adderall.  If she did not want to take it, please let me know anything she wants to try besides Adderall or methylphenidate.        Shannon Calixto M.D.

## 2018-07-19 ENCOUNTER — OFFICE VISIT (OUTPATIENT)
Dept: MEDICAL GROUP | Age: 23
End: 2018-07-19
Payer: COMMERCIAL

## 2018-07-19 VITALS
HEIGHT: 63 IN | BODY MASS INDEX: 22.36 KG/M2 | SYSTOLIC BLOOD PRESSURE: 128 MMHG | DIASTOLIC BLOOD PRESSURE: 76 MMHG | WEIGHT: 126.2 LBS | TEMPERATURE: 99.4 F | HEART RATE: 101 BPM | OXYGEN SATURATION: 100 %

## 2018-07-19 DIAGNOSIS — F90.2 ATTENTION DEFICIT HYPERACTIVITY DISORDER (ADHD), COMBINED TYPE: ICD-10-CM

## 2018-07-19 PROCEDURE — 99214 OFFICE O/P EST MOD 30 MIN: CPT | Performed by: INTERNAL MEDICINE

## 2018-07-19 RX ORDER — DEXTROAMPHETAMINE SULFATE, DEXTROAMPHETAMINE SACCHARATE, AMPHETAMINE SULFATE AND AMPHETAMINE ASPARTATE 7.5; 7.5; 7.5; 7.5 MG/1; MG/1; MG/1; MG/1
30 CAPSULE, EXTENDED RELEASE ORAL
Refills: 0 | COMMUNITY
Start: 2018-07-04 | End: 2018-08-08 | Stop reason: SDUPTHER

## 2018-07-20 NOTE — PROGRESS NOTES
"Subjective:   Lester Chávez is a 23 y.o. female here today for evaluation and management of:      Attention deficit hyperactivity disorder (ADHD), combined type  Patient initially called to switch Adderall XR to alternating medicine as her insurance did not cover Adderall. We then switched to Ritalin on 6/25/18.  Patient states that she did not like Ritalin due to feeling weird after taking it. Then she switched back to Adderall XR 30 mg daily. She stated that her insurance covers brand Adderall XR.  Patient reported that she is able to control her mood and concentrate on her job if she is taking Adderall.  She has been taking it since her primary school.  Patient denied side effects from taking it.  She stated that she has establish care appointment with psychiatrist on 9/28/18.  She wanted to receive refill of Adderall with the same dose until she can establish care with her psychiatrist.     Patient states that she will try to slowly cut down Adderall as she is not taking it if she does not need to walk.  She also will cut it half dose and keep low dose as possible.  She denied denied drinking alcohol.  She smoked marijuana sometimes.           Current medicines (including changes today)  Current Outpatient Prescriptions   Medication Sig Dispense Refill   • ADDERALL XR, 30MG, 30 MG XR capsule 30 mg.  0   • JUNEL FE 1/20 1-20 MG-MCG per tablet Take 1 Tab by mouth every day. 28 Tab 11     No current facility-administered medications for this visit.      She  has a past medical history of SVT (supraventricular tachycardia) ([facility]).    ROS   No chest pain, no shortness of breath, no abdominal pain       Objective:     Blood pressure 128/76, pulse (!) 101, temperature 37.4 °C (99.4 °F), height 1.588 m (5' 2.5\"), weight 57.2 kg (126 lb 3.2 oz), SpO2 100 %. Body mass index is 22.71 kg/m².   Physical Exam:  General: Alert, oriented and no acute distress.  Eye contact is good, speech goal directed, affect calm  HEENT: " conjunctiva non-injected, sclera non-icteric.  Oral mucous membranes pink and moist with no lesions.  Pinna normal.   Lungs: Normal respiratory effort, clear to auscultation bilaterally with good excursion.  CV: regular rate and rhythm. No murmurs.   Abdomen: soft, non distended, nontender, Bowel sound normal.  Ext: no edema, color normal, vascularity normal, temperature normal        Assessment and Plan:   The following treatment plan was discussed     1. Attention deficit hyperactivity disorder (ADHD), combined type  - Patient stated that she wanted to come out from Adderall XR, but she is not ready to do it yet. She was initially upset that she may not receive refill anymore while she is waiting for appointment with psychiatrist.  I explained to patient that I will refill it once a month until she can see her psychiatrist on 9/28/18.  Patient agreed with the plan.  - She stated that tried to cut down the dose of Adderall and she tried to avoid taking it in her day off from work.  - I discussed with patient alternating treatment as she said that she wanted to wean down Adderall.  - She stated that she wants to continue Adderall XR until she sees her psychiatrist.  - She just have Adderall XR filled this month and she does not require to refill it today.  - She is advised to call to refill for next month when it is due.  - I have extensive discussion with patient the risks and benefits of Adderall and she states that she understand it and she will carefully monitor side effects.  Patient is advised not to use any recreational drugs or drink any alcohol.  - We discussed regular physical exercise, meditation, mindfulness exercise, etc.  - Patient has established care appointment with psychiatrist on 9/28/18.    Face-to-face time spent 25 minutes with patient and more than half of that time spent for counseling and cooperating of care for medical problems listed above.       Followup: Return if symptoms worsen or fail  to improve.      Please note that this dictation was created using voice recognition software. I have made every reasonable attempt to correct obvious errors, but I expect that there may have unintended errors in text, spelling, punctuation, or grammar that I did not discover.

## 2018-07-20 NOTE — ASSESSMENT & PLAN NOTE
Patient initially called to switch Adderall XR to alternating medicine as her insurance did not cover Adderall. We then switched to Ritalin on 6/25/18.  Patient states that she did not like Ritalin due to feeling weird after taking it. Then she switched back to Adderall XR 30 mg daily. She stated that her insurance covers brand Adderall XR.  Patient reported that she is able to control her mood and concentrate on her job if she is taking Adderall.  She has been taking it since her primary school.  Patient denied side effects from taking it.  She stated that she has establish care appointment with psychiatrist on 9/28/18.  She wanted to receive refill of Adderall with the same dose until she can establish care with her psychiatrist.     Patient states that she will try to slowly cut down Adderall as she is not taking it if she does not need to walk.  She also will cut it half dose and keep low dose as possible.  She denied denied drinking alcohol.  She smoked marijuana sometimes.

## 2018-08-08 DIAGNOSIS — F90.0 ATTENTION DEFICIT HYPERACTIVITY DISORDER (ADHD), PREDOMINANTLY INATTENTIVE TYPE: ICD-10-CM

## 2018-08-08 RX ORDER — DEXTROAMPHETAMINE SULFATE, DEXTROAMPHETAMINE SACCHARATE, AMPHETAMINE SULFATE AND AMPHETAMINE ASPARTATE 7.5; 7.5; 7.5; 7.5 MG/1; MG/1; MG/1; MG/1
30 CAPSULE, EXTENDED RELEASE ORAL DAILY
Qty: 30 CAP | Refills: 0 | Status: SHIPPED | OUTPATIENT
Start: 2018-08-08 | End: 2018-10-30 | Stop reason: SDUPTHER

## 2018-08-13 ENCOUNTER — TELEPHONE (OUTPATIENT)
Dept: MEDICAL GROUP | Age: 23
End: 2018-08-13

## 2018-08-13 NOTE — TELEPHONE ENCOUNTER
Phone Number Called: [phone] (home)       Message: informed patient that prescription can't be called in and to stop by office to .     Left Message for patient to call back: yes

## 2018-10-30 ENCOUNTER — TELEPHONE (OUTPATIENT)
Dept: MEDICAL GROUP | Age: 23
End: 2018-10-30

## 2018-10-30 DIAGNOSIS — F90.0 ATTENTION DEFICIT HYPERACTIVITY DISORDER (ADHD), PREDOMINANTLY INATTENTIVE TYPE: ICD-10-CM

## 2018-10-30 RX ORDER — DEXTROAMPHETAMINE SULFATE, DEXTROAMPHETAMINE SACCHARATE, AMPHETAMINE SULFATE AND AMPHETAMINE ASPARTATE 7.5; 7.5; 7.5; 7.5 MG/1; MG/1; MG/1; MG/1
30 CAPSULE, EXTENDED RELEASE ORAL DAILY
Qty: 30 CAP | Refills: 0 | Status: SHIPPED
Start: 2018-10-30 | End: 2018-11-27

## 2018-10-30 NOTE — TELEPHONE ENCOUNTER
1. Caller Name: Lester Chávez                                           Call Back Number: [phone]   She needs a prescription for Adderall 30MG.     Patient called stating that she had an appointment scheduled with behavioral health on September 14th. However, they called to reschedule because they were moving offices at that time.     Patient states her next appointment with behavioral health isn't until November 27.    She also states that her July prescription has last her until this long because she is currently cutting down the dose and spreading it out. She states she needs enough to get her to her next appointment.    Please advise.

## 2018-10-31 NOTE — TELEPHONE ENCOUNTER
was checked before medication refill.  Last refill on Adderall XR 30 mg, #30 tabs on 8/13/18    Please inform patient that I refilled Adderall XR 30 mg to take 1 capsule once a day.  Please fax prescription to [facility] pharmacy.  Please advise patient to keep follow-up appointment with psychiatrist, Dr. Tuttle as scheduled on 11/27/18.    Shannon Calixto M.D.

## 2018-11-27 ENCOUNTER — OFFICE VISIT (OUTPATIENT)
Dept: BEHAVIORAL HEALTH | Facility: CLINIC | Age: 23
End: 2018-11-27
Payer: COMMERCIAL

## 2018-11-27 VITALS
BODY MASS INDEX: 24.63 KG/M2 | HEIGHT: 63 IN | HEART RATE: 76 BPM | SYSTOLIC BLOOD PRESSURE: 120 MMHG | DIASTOLIC BLOOD PRESSURE: 75 MMHG | WEIGHT: 139 LBS

## 2018-11-27 DIAGNOSIS — F90.2 ATTENTION DEFICIT HYPERACTIVITY DISORDER (ADHD), COMBINED TYPE: ICD-10-CM

## 2018-11-27 PROCEDURE — 99204 OFFICE O/P NEW MOD 45 MIN: CPT | Performed by: PSYCHIATRY & NEUROLOGY

## 2018-11-27 RX ORDER — DEXTROAMPHETAMINE SACCHARATE, AMPHETAMINE ASPARTATE MONOHYDRATE, DEXTROAMPHETAMINE SULFATE AND AMPHETAMINE SULFATE 3.75; 3.75; 3.75; 3.75 MG/1; MG/1; MG/1; MG/1
15 CAPSULE, EXTENDED RELEASE ORAL EVERY MORNING
Qty: 30 CAP | Refills: 0 | Status: SHIPPED | OUTPATIENT
Start: 2018-12-28 | End: 2019-01-27

## 2018-11-27 RX ORDER — DEXTROAMPHETAMINE SACCHARATE, AMPHETAMINE ASPARTATE MONOHYDRATE, DEXTROAMPHETAMINE SULFATE AND AMPHETAMINE SULFATE 3.75; 3.75; 3.75; 3.75 MG/1; MG/1; MG/1; MG/1
15 CAPSULE, EXTENDED RELEASE ORAL EVERY MORNING
Qty: 30 CAP | Refills: 0 | Status: SHIPPED | OUTPATIENT
Start: 2018-11-29 | End: 2018-12-29

## 2018-11-27 RX ORDER — DEXTROAMPHETAMINE SACCHARATE, AMPHETAMINE ASPARTATE MONOHYDRATE, DEXTROAMPHETAMINE SULFATE AND AMPHETAMINE SULFATE 3.75; 3.75; 3.75; 3.75 MG/1; MG/1; MG/1; MG/1
15 CAPSULE, EXTENDED RELEASE ORAL EVERY MORNING
Qty: 30 CAP | Refills: 0 | Status: SHIPPED | OUTPATIENT
Start: 2019-01-26 | End: 2019-02-25

## 2018-11-27 ASSESSMENT — PATIENT HEALTH QUESTIONNAIRE - PHQ9
SUM OF ALL RESPONSES TO PHQ QUESTIONS 1-9: 5
5. POOR APPETITE OR OVEREATING: 2 - MORE THAN HALF THE DAYS
CLINICAL INTERPRETATION OF PHQ2 SCORE: 1

## 2018-11-27 NOTE — PROGRESS NOTES
INITIAL PSYCHIATRY EVALUATION      Chief Complaint   Patient presents with   • ADHD         History Of Present Illness:  Lester Chávez is a 23 y.o. old female with history of SVT s/p ablation in high school, ADHD referred by Shannon Calixto M.D for evaluation of ADHD.  She reports being diagnosed with ADHD when she was in first grade and she has been on stimulant medications since then.  She recalls struggling with hyperactivity and impaired focus when she was in school.  She was evaluated and diagnosed and she noticed better grades and improved focus after she was put on stimulant medications.  She moved to [place] after graduating high school from [place].  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  She took Adderall XR 30 mg and IR 10 mg daily when she was in school.  She has lately been trying to cut herself off Adderall and would eventually want to be off the medication.  She has been using half of her current dose of 30 mg and has noticed some differences but would like to stick to the low-dose.  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  She also feels that Adderall helps her with motivation and drive.  She struggles with waiting for her tones and lines and avoids them as much as she can.  She also feels restless when she has been sitting for a long time in a meeting or when she is in playing but is able to push through when she is on Adderall.  She notices anxiety when she is on higher dose of Adderall but is currently doing better.  She endorses some situational sadness lasting for days but denies symptoms consistent with major depressive disorder.  She denies any current psychosocial stressors.  She has good support from a boyfriend in [facility] and her mother and stepfather all live in [place].  She denies any  "current problems with her sleep or appetite.  She denies anhedonia, enjoys dancing and being outdoors.  She denies having thoughts of wanting to hurt herself or others.    Current psychiatric medications - Adderall XR 30 mg daily    Past Psychiatric History:  Denies history of suicide attempt or prior inpatient psychiatry hospitalization.  Previous medication trials - Ritalin, Concerta, Aderall    Past Medical/Surgical History:  Past Medical History:   Diagnosis Date   • ADD (attention deficit disorder)    • SVT (supraventricular tachycardia) (HCC)     treated with cardiac ablation at age 15-16     Past Surgical History:   Procedure Laterality Date   • OTHER CARDIAC SURGERY      \"I had a heart ablation 4 or 5 years ago. when I was in high school.\"       Family Psychiatric History:  Father - ADHD, cocaine addiction - in remission    Substance Use/Addiction History:  Alcohol - Drinks few times a month.  She denies blackout drinking, DUIs, impairments in her personal or professional life due to excessive alcohol use, ER visits or hospitalizations for alcohol intoxication or detox.  Nicotine - Denies  Illicit drugs - Recreational cannabis use, once or twice a month.  She denies any other illicit drug use.    Social History:  She lives alone in [place].  Her mother and stepfather live in [place] and she is close with both of them.  She is currently in a relationship, has never been  and has no kids.  She has a bachelor's degree in journalism.  She is trying to set up her own [employer] business in [facility].  Allergies:  Patient has no known allergies.    Medications:  Current Outpatient Prescriptions   Medication Sig Dispense Refill   • [START ON 11/29/2018] amphetamine-dextroamphetamine (ADDERALL XR) 15 MG XR capsule Take 1 Cap by mouth every morning for 30 days. 30 Cap 0   • [START ON 12/28/2018] amphetamine-dextroamphetamine (ADDERALL XR) 15 MG XR capsule Take 1 Cap by mouth every morning for 30 days. 30 Cap 0 " "  • [START ON 1/26/2019] amphetamine-dextroamphetamine (ADDERALL XR) 15 MG XR capsule Take 1 Cap by mouth every morning for 30 days. 30 Cap 0   • JUNEL FE 1/20 1-20 MG-MCG per tablet Take 1 Tab by mouth every day. 28 Tab 11     No current facility-administered medications for this visit.        Review of Symptoms:  Constitutional - Negative for fatigue  Eyes - Negative for blurry vision  HEENT - Negative for sore throat  Respiratory - Negative for shortness of breath, cough  CVS - Negative for chest pain, palpitations  GI - Negative for nausea, vomiting, abdominal pain, diarrhea, constipation  Skin - Negative for rash  Musculoskeletal - Negative for back pain  Neurological - Negative for headaches  Psychiatric - Positive for impaired concentration     Physical Examination:  Vital signs: /75 (BP Location: Right arm, Patient Position: Sitting)   Pulse 76   Ht 1.6 m (5' 3\")   Wt 63 kg (139 lb)   LMP 11/25/2018   BMI 24.62 kg/m²     Musculoskeletal: Normal gait. No abnormal movements.     Mental Status Evaluation:   General: Young white female, dressed in casual attire, good grooming and hygiene, in no apparent distress, calm and cooperative, good eye contact, no psychomotor agitation or retardation  Orientation: Alert and oriented to person, place and time  Recent and remote memory: Intact  Attention span and concentration: Intact  Speech: Spontaneous, normal rate, rhythm and tone  Thought Process: Linear, logical and goal directed  Thought Content: Denies suicidal or homicidal ideations, intent or plan  Perception: Denies auditory or visual hallucinations. No delusions noted  Associations: Intact  Language: Appropriate  Fund of knowledge and vocabulary: Adequate  Mood: \"good\"  Affect: Euthymic, mood congruent  Insight: Good  Judgment: Good    Depression screening:  Depression Screen (PHQ-2/PHQ-9) 6/12/2018 11/27/2018   PHQ-2 Total Score 0 1   PHQ-9 Total Score - 5       Interpretation of PHQ-9 Total Score "   Score Severity   1-4 No Depression   5-9 Mild Depression   10-14 Moderate Depression   15-19 Moderately Severe Depression   20-27 Severe Depression    Medical Records/Labs/Diagnostic Tests Reviewed:  [place]  records - appropriate refills on stimulant medications, no abuse suspected    Impression:  Young female with symptoms consistent with ADHD.  She has been on stimulants since first grade and endorses benefit.  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    1.  ADHD, combined type    Plan:  1.  Continue Adderall XR but decrease dose to 50 mg in the morning for ADHD symptoms.  Provided her with 3 prescriptions for 30 tabs each.  She is interested in tapering herself off Adderall eventually but at this time she will benefit from staying on her stimulant medication.  She has a history of supraventricular tachycardia which was treated with cardiac ablation in high school and she currently denies any palpitations, dizziness or lightheadedness.  I have discussed long-term side effects including tolerance, abuse and dependence potential, tachycardia, hypertension etc.  She has reviewed and signed the controlled medication policy.    Return to clinic in 3 months or sooner if symptoms worsen    The proposed treatment plan was discussed with the patient who was provided the opportunity to ask questions and make suggestions regarding alternative treatment. Patient verbalized understanding and expressed agreement with the plan.     Thank you for allowing me to participate in the care of this patient.    Aline Tuttle M.D.  11/27/18    CC:   Shannon Calixto M.D.    This note was created using voice recognition software (Dragon). The accuracy of the dictation is limited by the abilities of the software. I have reviewed the note prior to signing, however some errors in grammar and context are still possible.  If you have any questions related to this note please do not hesitate to contact our office.

## 2019-02-28 ENCOUNTER — APPOINTMENT (OUTPATIENT)
Dept: BEHAVIORAL HEALTH | Facility: CLINIC | Age: 24
End: 2019-02-28

## 2021-10-21 ENCOUNTER — OFFICE VISIT (OUTPATIENT)
Dept: URGENT CARE | Facility: CLINIC | Age: 26
End: 2021-10-21
Payer: COMMERCIAL

## 2021-10-21 VITALS
BODY MASS INDEX: 28.07 KG/M2 | SYSTOLIC BLOOD PRESSURE: 116 MMHG | OXYGEN SATURATION: 98 % | DIASTOLIC BLOOD PRESSURE: 80 MMHG | TEMPERATURE: 97.4 F | HEIGHT: 63 IN | HEART RATE: 91 BPM | WEIGHT: 158.4 LBS | RESPIRATION RATE: 16 BRPM

## 2021-10-21 DIAGNOSIS — S61.412A LACERATION OF LEFT HAND WITHOUT FOREIGN BODY, INITIAL ENCOUNTER: ICD-10-CM

## 2021-10-21 PROCEDURE — 12002 RPR S/N/AX/GEN/TRNK2.6-7.5CM: CPT | Performed by: NURSE PRACTITIONER

## 2021-10-21 RX ORDER — ATOMOXETINE 25 MG/1
25 CAPSULE ORAL
COMMUNITY
Start: 2021-10-16

## 2021-10-22 NOTE — PROGRESS NOTES
Chief Complaint   Patient presents with   • Laceration     left hand x today       HISTORY OF PRESENT ILLNESS: Patient is a pleasant 26 y.o. female who presents to urgent care today with concerns of a hand laceration.  She was using a kitchen knife with her right hand when she excellently cut the palm of her left hand.  This occurred just prior to clinic arrival.  She reports moderate pain.  Denies any numbness, tingling, weakness.  She is right-hand dominant.  Her tetanus is up-to-date.    Patient Active Problem List    Diagnosis Date Noted   • Encounter for surveillance of contraceptive pills 2018   • Attention deficit hyperactivity disorder (ADHD), combined type 2018       Allergies:Patient has no known allergies.    Current Outpatient Medications Ordered in Epic   Medication Sig Dispense Refill   • atomoxetine (STRATTERA) 25 MG capsule Take 25 mg by mouth every day.     • JUNEL FE 1/20 1-20 MG-MCG per tablet Take 1 Tab by mouth every day. 28 Tab 11     No current [facility]-ordered facility-administered medications on file.       Past Medical History:   Diagnosis Date   • ADD (attention deficit disorder)    • SVT (supraventricular tachycardia) (HCC)     treated with cardiac ablation at age 15-16       Social History     Tobacco Use   • Smoking status: Never Smoker   • Smokeless tobacco: Never Used   Substance Use Topics   • Alcohol use: Yes     Alcohol/week: 0.0 oz     Comment: few times a month   • Drug use: Yes     Types: Marijuana     Comment: recreationally cannabis use, 1-2 times a month       Family Status   Relation Name Status   • Mo  Alive   • Fa  Alive   • MGMo  Alive   • MGFa     • PGMo  Alive   • PGFa  Alive     Family History   Problem Relation Age of Onset   • Heart Disease Mother         vavular disease    • Drug abuse Father         cocaine addiciton, in remission   • No Known Problems Maternal Grandmother    • Cancer Maternal Grandfather         pancreatic cancer   • No Known  "Problems Paternal Grandmother    • No Known Problems Paternal Grandfather        ROS:  Review of Systems   Constitutional: Negative for fever, chills, weight loss, malaise, and fatigue.   HENT: Negative for ear pain, nosebleeds, congestion, sore throat and neck pain.    Eyes: Negative for vision changes.   Neuro: Negative for headache, sensory changes, weakness, seizure, LOC.   Cardiovascular: Negative for chest pain, palpitations, orthopnea and leg swelling.   Respiratory: Negative for cough, sputum production, shortness of breath and wheezing.   Gastrointestinal: Negative for abdominal pain, nausea, vomiting or diarrhea.   Genitourinary: Negative for dysuria, urgency and frequency.  Musculoskeletal: Negative for falls, neck pain, back pain, joint pain, myalgias.   Skin: Positive for laceration.  Negative for rash, diaphoresis.     Exam:  /80   Pulse 91   Temp 36.3 °C (97.4 °F) (Temporal)   Resp 16   Ht 1.6 m (5' 3\")   Wt 71.8 kg (158 lb 6.4 oz)   SpO2 98%   General: well-nourished, well-developed female in NAD  Head: normocephalic, atraumatic  Eyes: PERRLA, no conjunctival injection, acuity grossly intact, lids normal.  Ears: normal shape and symmetry, no tenderness, no discharge. External canals are without any significant edema or erythema. Tympanic membranes are without any inflammation, no effusion. Gross auditory acuity is intact.  Nose: symmetrical without tenderness, no discharge.  Mouth/Throat: reasonable hygiene, no erythema, exudates or tonsillar enlargement.  Neck: no masses, range of motion within normal limits, no tracheal deviation. No obvious thyroid enlargement.   Lymph: no cervical adenopathy. No supraclavicular adenopathy.   Neuro: alert and oriented. Cranial nerves 1-12 grossly intact. No sensory deficit.   Cardiovascular: regular rate and rhythm. No edema.  Pulmonary: no distress. Chest is symmetrical with respiration, no wheezes, crackles, or rhonchi.   Musculoskeletal: no " clubbing, appropriate muscle tone, gait is stable.  Skin: warm, no clubbing, no cyanosis, no rashes.  There is a 4 cm superficial and partial-thickness laceration, crescent-shaped, to palm of left hand.  No underlying tendon or ligament involvement noted.  No foreign bodies.  Laceration edges are well approximated.  Left hand has full range of motion, no bony tenderness.  Psych: appropriate mood, affect, judgement.     Laceration Repair Procedure Note      Indication: Hand laceration     Procedure: Risks including bleeding, nerve damage, infection, and poor cosmetic outcome discussed at length. Benefits and alternatives discussed. The patient was placed in the appropriate position and anesthesia around the laceration was obtained by infiltration using 1% Lidocaine without epinephrine. The area was then cleansed with betadine and draped in a sterile fashion and irrigated with normal saline. The laceration was closed with #5 5-0 Nylon using interrupted sutures with good wound approximation. There were no additional lacerations requiring repair. The wound area was then dressed.     Total repaired wound length:  4 cm     Other Items: Suture count: 5     The patient tolerated the procedure well.     Complications: None      Assessment/Plan:  1. Laceration of left hand without foreign body, initial encounter         The patient is a pleasant 26-year-old who presents to the clinic with a laceration to left palm, wound repaired in clinic.  Wound care discussed.  Return to clinic in 10 days for removal of sutures.  Supportive care, differential diagnoses, and indications for immediate follow-up discussed with patient.   Pathogenesis of diagnosis discussed including typical length and natural progression.   Instructed to return to clinic or nearest emergency department for any change in condition, further concerns, or worsening of symptoms.  Patient states understanding of the plan of care and discharge  instructions.        Please note that this dictation was created using voice recognition software. I have made every reasonable attempt to correct obvious errors, but I expect that there are errors of grammar and possibly content that I did not discover before finalizing the note. Patient evaluated by myself and CONCEPCION Ortiz student.         ARIA Nash.

## 2023-02-08 ENCOUNTER — APPOINTMENT (RX ONLY)
Dept: URBAN - METROPOLITAN AREA CLINIC 6 | Facility: CLINIC | Age: 28
Setting detail: DERMATOLOGY
End: 2023-02-08

## 2023-02-08 DIAGNOSIS — L82.1 OTHER SEBORRHEIC KERATOSIS: ICD-10-CM

## 2023-02-08 DIAGNOSIS — L81.4 OTHER MELANIN HYPERPIGMENTATION: ICD-10-CM

## 2023-02-08 DIAGNOSIS — L21.8 OTHER SEBORRHEIC DERMATITIS: ICD-10-CM

## 2023-02-08 DIAGNOSIS — D22 MELANOCYTIC NEVI: ICD-10-CM

## 2023-02-08 DIAGNOSIS — D18.0 HEMANGIOMA: ICD-10-CM

## 2023-02-08 DIAGNOSIS — Z71.89 OTHER SPECIFIED COUNSELING: ICD-10-CM

## 2023-02-08 PROBLEM — D22.61 MELANOCYTIC NEVI OF RIGHT UPPER LIMB, INCLUDING SHOULDER: Status: ACTIVE | Noted: 2023-02-08

## 2023-02-08 PROBLEM — D18.01 HEMANGIOMA OF SKIN AND SUBCUTANEOUS TISSUE: Status: ACTIVE | Noted: 2023-02-08

## 2023-02-08 PROBLEM — D22.72 MELANOCYTIC NEVI OF LEFT LOWER LIMB, INCLUDING HIP: Status: ACTIVE | Noted: 2023-02-08

## 2023-02-08 PROBLEM — D22.71 MELANOCYTIC NEVI OF RIGHT LOWER LIMB, INCLUDING HIP: Status: ACTIVE | Noted: 2023-02-08

## 2023-02-08 PROBLEM — D22.62 MELANOCYTIC NEVI OF LEFT UPPER LIMB, INCLUDING SHOULDER: Status: ACTIVE | Noted: 2023-02-08

## 2023-02-08 PROBLEM — D22.5 MELANOCYTIC NEVI OF TRUNK: Status: ACTIVE | Noted: 2023-02-08

## 2023-02-08 PROCEDURE — 99203 OFFICE O/P NEW LOW 30 MIN: CPT

## 2023-02-08 PROCEDURE — ? DIAGNOSIS COMMENT

## 2023-02-08 PROCEDURE — ? COUNSELING

## 2023-02-08 ASSESSMENT — LOCATION SIMPLE DESCRIPTION DERM
LOCATION SIMPLE: RIGHT THIGH
LOCATION SIMPLE: LEFT KNEE
LOCATION SIMPLE: LEFT UPPER ARM
LOCATION SIMPLE: RIGHT FOREARM
LOCATION SIMPLE: LEFT PRETIBIAL REGION
LOCATION SIMPLE: ANTERIOR SCALP
LOCATION SIMPLE: RIGHT KNEE
LOCATION SIMPLE: LEFT THIGH
LOCATION SIMPLE: CHEST
LOCATION SIMPLE: ABDOMEN
LOCATION SIMPLE: RIGHT UPPER ARM
LOCATION SIMPLE: RIGHT PRETIBIAL REGION
LOCATION SIMPLE: LEFT FOREARM

## 2023-02-08 ASSESSMENT — LOCATION DETAILED DESCRIPTION DERM
LOCATION DETAILED: PERIUMBILICAL SKIN
LOCATION DETAILED: RIGHT ANTERIOR PROXIMAL UPPER ARM
LOCATION DETAILED: RIGHT KNEE
LOCATION DETAILED: RIGHT ANTERIOR DISTAL THIGH
LOCATION DETAILED: RIGHT ANTECUBITAL SKIN
LOCATION DETAILED: RIGHT ANTERIOR DISTAL UPPER ARM
LOCATION DETAILED: LEFT VENTRAL PROXIMAL FOREARM
LOCATION DETAILED: EPIGASTRIC SKIN
LOCATION DETAILED: MID-FRONTAL SCALP
LOCATION DETAILED: LEFT PROXIMAL PRETIBIAL REGION
LOCATION DETAILED: LEFT ANTERIOR DISTAL UPPER ARM
LOCATION DETAILED: LEFT KNEE
LOCATION DETAILED: RIGHT PROXIMAL PRETIBIAL REGION
LOCATION DETAILED: RIGHT VENTRAL PROXIMAL FOREARM
LOCATION DETAILED: LEFT ANTERIOR DISTAL THIGH
LOCATION DETAILED: LEFT ANTERIOR PROXIMAL UPPER ARM
LOCATION DETAILED: LOWER STERNUM

## 2023-02-08 ASSESSMENT — LOCATION ZONE DERM
LOCATION ZONE: SCALP
LOCATION ZONE: ARM
LOCATION ZONE: LEG
LOCATION ZONE: TRUNK

## 2023-02-08 NOTE — PROCEDURE: DIAGNOSIS COMMENT
Detail Level: Simple
Comment: Will reach out to clinic if she is interested in treatment with ketoconazole shampoo.
Render Risk Assessment In Note?: no

## 2024-05-14 ENCOUNTER — APPOINTMENT (RX ONLY)
Dept: URBAN - METROPOLITAN AREA CLINIC 6 | Facility: CLINIC | Age: 29
Setting detail: DERMATOLOGY
End: 2024-05-14

## 2024-05-14 DIAGNOSIS — L21.8 OTHER SEBORRHEIC DERMATITIS: ICD-10-CM

## 2024-05-14 DIAGNOSIS — Z71.89 OTHER SPECIFIED COUNSELING: ICD-10-CM

## 2024-05-14 DIAGNOSIS — D22 MELANOCYTIC NEVI: ICD-10-CM

## 2024-05-14 DIAGNOSIS — L20.89 OTHER ATOPIC DERMATITIS: ICD-10-CM | Status: INADEQUATELY CONTROLLED

## 2024-05-14 DIAGNOSIS — D18.0 HEMANGIOMA: ICD-10-CM

## 2024-05-14 PROBLEM — D22.62 MELANOCYTIC NEVI OF LEFT UPPER LIMB, INCLUDING SHOULDER: Status: ACTIVE | Noted: 2024-05-14

## 2024-05-14 PROBLEM — D22.71 MELANOCYTIC NEVI OF RIGHT LOWER LIMB, INCLUDING HIP: Status: ACTIVE | Noted: 2024-05-14

## 2024-05-14 PROBLEM — D22.61 MELANOCYTIC NEVI OF RIGHT UPPER LIMB, INCLUDING SHOULDER: Status: ACTIVE | Noted: 2024-05-14

## 2024-05-14 PROBLEM — D18.01 HEMANGIOMA OF SKIN AND SUBCUTANEOUS TISSUE: Status: ACTIVE | Noted: 2024-05-14

## 2024-05-14 PROBLEM — D22.72 MELANOCYTIC NEVI OF LEFT LOWER LIMB, INCLUDING HIP: Status: ACTIVE | Noted: 2024-05-14

## 2024-05-14 PROBLEM — D22.5 MELANOCYTIC NEVI OF TRUNK: Status: ACTIVE | Noted: 2024-05-14

## 2024-05-14 PROBLEM — L30.9 DERMATITIS, UNSPECIFIED: Status: ACTIVE | Noted: 2024-05-14

## 2024-05-14 PROCEDURE — 99214 OFFICE O/P EST MOD 30 MIN: CPT

## 2024-05-14 PROCEDURE — ? PRESCRIPTION

## 2024-05-14 PROCEDURE — ? PHOTO-DOCUMENTATION

## 2024-05-14 PROCEDURE — ? DIAGNOSIS COMMENT

## 2024-05-14 PROCEDURE — ? COUNSELING

## 2024-05-14 RX ORDER — KETOCONAZOLE 20 MG/ML
1 SHAMPOO, SUSPENSION TOPICAL
Qty: 120 | Refills: 2 | Status: ERX | COMMUNITY
Start: 2024-05-14

## 2024-05-14 RX ORDER — TRIAMCINOLONE ACETONIDE 1 MG/G
CREAM TOPICAL BID
Qty: 30 | Refills: 1 | Status: ERX | COMMUNITY
Start: 2024-05-14

## 2024-05-14 RX ADMIN — KETOCONAZOLE 1: 20 SHAMPOO, SUSPENSION TOPICAL at 00:00

## 2024-05-14 RX ADMIN — TRIAMCINOLONE ACETONIDE: 1 CREAM TOPICAL at 00:00

## 2024-05-14 ASSESSMENT — LOCATION SIMPLE DESCRIPTION DERM
LOCATION SIMPLE: RIGHT KNEE
LOCATION SIMPLE: LEFT PRETIBIAL REGION
LOCATION SIMPLE: ANTERIOR SCALP
LOCATION SIMPLE: RIGHT PRETIBIAL REGION
LOCATION SIMPLE: LEFT UPPER ARM
LOCATION SIMPLE: LEFT FOREARM
LOCATION SIMPLE: CHEST
LOCATION SIMPLE: ABDOMEN
LOCATION SIMPLE: LEFT LOWER LEG
LOCATION SIMPLE: RIGHT UPPER ARM
LOCATION SIMPLE: LEFT KNEE

## 2024-05-14 ASSESSMENT — LOCATION ZONE DERM
LOCATION ZONE: LEG
LOCATION ZONE: TRUNK
LOCATION ZONE: SCALP
LOCATION ZONE: ARM

## 2024-05-14 ASSESSMENT — LOCATION DETAILED DESCRIPTION DERM
LOCATION DETAILED: RIGHT KNEE
LOCATION DETAILED: RIGHT ANTECUBITAL SKIN
LOCATION DETAILED: LEFT ANTERIOR PROXIMAL UPPER ARM
LOCATION DETAILED: LEFT LATERAL PROXIMAL CALF
LOCATION DETAILED: LEFT KNEE
LOCATION DETAILED: RIGHT ANTERIOR PROXIMAL UPPER ARM
LOCATION DETAILED: MID-FRONTAL SCALP
LOCATION DETAILED: LOWER STERNUM
LOCATION DETAILED: LEFT PROXIMAL PRETIBIAL REGION
LOCATION DETAILED: EPIGASTRIC SKIN
LOCATION DETAILED: RIGHT PROXIMAL PRETIBIAL REGION
LOCATION DETAILED: LEFT VENTRAL PROXIMAL FOREARM

## 2024-05-14 NOTE — PROCEDURE: DIAGNOSIS COMMENT
Render Risk Assessment In Note?: no
Comment: Started about 3 months ago after returning from a trip to [facility]. Mildly itchy. \\nDenies history of topical steroid use. \\nLow concern for deep fungal infection at this time, although plan to perform biopsy at follow up visit if unimproved after treatment with TAC cream BID.
Detail Level: Simple

## 2024-08-22 ENCOUNTER — APPOINTMENT (RX ONLY)
Dept: URBAN - METROPOLITAN AREA CLINIC 6 | Facility: CLINIC | Age: 29
Setting detail: DERMATOLOGY
End: 2024-08-22

## 2024-08-22 DIAGNOSIS — D22 MELANOCYTIC NEVI: ICD-10-CM

## 2024-08-22 DIAGNOSIS — L70.0 ACNE VULGARIS: ICD-10-CM

## 2024-08-22 DIAGNOSIS — L20.89 OTHER ATOPIC DERMATITIS: ICD-10-CM

## 2024-08-22 PROBLEM — L30.9 DERMATITIS, UNSPECIFIED: Status: ACTIVE | Noted: 2024-08-22

## 2024-08-22 PROBLEM — D22.62 MELANOCYTIC NEVI OF LEFT UPPER LIMB, INCLUDING SHOULDER: Status: ACTIVE | Noted: 2024-08-22

## 2024-08-22 PROBLEM — D22.61 MELANOCYTIC NEVI OF RIGHT UPPER LIMB, INCLUDING SHOULDER: Status: ACTIVE | Noted: 2024-08-22

## 2024-08-22 PROCEDURE — ? PRESCRIPTION

## 2024-08-22 PROCEDURE — 99214 OFFICE O/P EST MOD 30 MIN: CPT

## 2024-08-22 PROCEDURE — ? COUNSELING

## 2024-08-22 PROCEDURE — ? PRESCRIPTION MEDICATION MANAGEMENT

## 2024-08-22 PROCEDURE — ? PHOTO-DOCUMENTATION

## 2024-08-22 PROCEDURE — ? TREATMENT REGIMEN

## 2024-08-22 PROCEDURE — ? DIAGNOSIS COMMENT

## 2024-08-22 RX ORDER — TRETIONIN 0.25 MG/G
CREAM TOPICAL QHS
Qty: 45 | Refills: 3 | Status: ERX | COMMUNITY
Start: 2024-08-22

## 2024-08-22 RX ADMIN — TRETIONIN: 0.25 CREAM TOPICAL at 00:00

## 2024-08-22 ASSESSMENT — LOCATION DETAILED DESCRIPTION DERM
LOCATION DETAILED: LEFT PROXIMAL POSTERIOR UPPER ARM
LOCATION DETAILED: LEFT INFERIOR CENTRAL MALAR CHEEK
LOCATION DETAILED: RIGHT PROXIMAL POSTERIOR UPPER ARM
LOCATION DETAILED: LEFT LATERAL PROXIMAL CALF

## 2024-08-22 ASSESSMENT — LOCATION SIMPLE DESCRIPTION DERM
LOCATION SIMPLE: LEFT LOWER LEG
LOCATION SIMPLE: LEFT POSTERIOR UPPER ARM
LOCATION SIMPLE: LEFT CHEEK
LOCATION SIMPLE: RIGHT POSTERIOR UPPER ARM

## 2024-08-22 ASSESSMENT — LOCATION ZONE DERM
LOCATION ZONE: ARM
LOCATION ZONE: LEG
LOCATION ZONE: FACE

## 2024-08-22 NOTE — PROCEDURE: TREATMENT REGIMEN
Action 4: Continue
Shannon La Roche-Posay Products: No
Treatment 1: tretinoin 0.025% cream
Action 1: Start
Detail Level: Zone

## 2024-08-22 NOTE — PROCEDURE: DIAGNOSIS COMMENT
Render Risk Assessment In Note?: no
Comment: Started about 3 months ago after returning from a trip to [facility]. Mildly itchy. \\nHas improved slightly after using triamcinolone, but hasn’t completely resolved. \\nLow concern for deep fungal infection at this time, although plan to perform biopsy at follow up visit if unimproved after treatment with zoryve cream.
Detail Level: Simple

## 2024-08-22 NOTE — PROCEDURE: PRESCRIPTION MEDICATION MANAGEMENT
Render In Strict Bullet Format?: No
Samples Given: Zoryve
Detail Level: Zone
Initiate Treatment: tretinoin 0.025 % topical cream QHS

## 2024-08-22 NOTE — PROCEDURE: COUNSELING
Detail Level: Detailed
Aklief counseling:  Patient advised to apply a pea-sized amount only at bedtime and wait 30 minutes after washing their face before applying.  If too drying, patient may add a non-comedogenic moisturizer.  The most commonly reported side effects including irritation, redness, scaling, dryness, stinging, burning, itching, and increased risk of sunburn.  The patient verbalized understanding of the proper use and possible adverse effects of retinoids.  All of the patient's questions and concerns were addressed.
Topical Retinoid Pregnancy And Lactation Text: This medication is Pregnancy Category C. It is unknown if this medication is excreted in breast milk.
Birth Control Pills Pregnancy And Lactation Text: This medication should be avoided if pregnant and for the first 30 days post-partum.
Isotretinoin Counseling: Patient should get monthly blood tests, not donate blood, not drive at night if vision affected, not share medication, and not undergo elective surgery for 6 months after tx completed. Side effects reviewed, pt to contact office should one occur.
Winlevi Counseling:  I discussed with the patient the risks of topical clascoterone including but not limited to erythema, scaling, itching, and stinging. Patient voiced their understanding.
Sarecycline Pregnancy And Lactation Text: This medication is Pregnancy Category D and not consider safe during pregnancy. It is also excreted in breast milk.
Azelaic Acid Counseling: Patient counseled that medicine may cause skin irritation and to avoid applying near the eyes.  In the event of skin irritation, the patient was advised to reduce the amount of the drug applied or use it less frequently.   The patient verbalized understanding of the proper use and possible adverse effects of azelaic acid.  All of the patient's questions and concerns were addressed.
Tazorac Pregnancy And Lactation Text: This medication is not safe during pregnancy. It is unknown if this medication is excreted in breast milk.
Dapsone Pregnancy And Lactation Text: This medication is Pregnancy Category C and is not considered safe during pregnancy or breast feeding.
Azithromycin Counseling:  I discussed with the patient the risks of azithromycin including but not limited to GI upset, allergic reaction, drug rash, diarrhea, and yeast infections.
High Dose Vitamin A Counseling: Side effects reviewed, pt to contact office should one occur.
Azelaic Acid Pregnancy And Lactation Text: This medication is considered safe during pregnancy and breast feeding.
Spironolactone Counseling: Patient advised regarding risks of diarrhea, abdominal pain, hyperkalemia, birth defects (for female patients), liver toxicity and renal toxicity. The patient may need blood work to monitor liver and kidney function and potassium levels while on therapy. The patient verbalized understanding of the proper use and possible adverse effects of spironolactone.  All of the patient's questions and concerns were addressed.
Topical Clindamycin Counseling: Patient counseled that this medication may cause skin irritation or allergic reactions.  In the event of skin irritation, the patient was advised to reduce the amount of the drug applied or use it less frequently.   The patient verbalized understanding of the proper use and possible adverse effects of clindamycin.  All of the patient's questions and concerns were addressed.
High Dose Vitamin A Pregnancy And Lactation Text: High dose vitamin A therapy is contraindicated during pregnancy and breast feeding.
Azithromycin Pregnancy And Lactation Text: This medication is considered safe during pregnancy and is also secreted in breast milk.
Include Pregnancy/Lactation Warning?: No
Doxycycline Counseling:  Patient counseled regarding possible photosensitivity and increased risk for sunburn.  Patient instructed to avoid sunlight, if possible.  When exposed to sunlight, patients should wear protective clothing, sunglasses, and sunscreen.  The patient was instructed to call the office immediately if the following severe adverse effects occur:  hearing changes, easy bruising/bleeding, severe headache, or vision changes.  The patient verbalized understanding of the proper use and possible adverse effects of doxycycline.  All of the patient's questions and concerns were addressed.
Tetracycline Counseling: Patient counseled regarding possible photosensitivity and increased risk for sunburn.  Patient instructed to avoid sunlight, if possible.  When exposed to sunlight, patients should wear protective clothing, sunglasses, and sunscreen.  The patient was instructed to call the office immediately if the following severe adverse effects occur:  hearing changes, easy bruising/bleeding, severe headache, or vision changes.  The patient verbalized understanding of the proper use and possible adverse effects of tetracycline.  All of the patient's questions and concerns were addressed. Patient understands to avoid pregnancy while on therapy due to potential birth defects.
Bactrim Pregnancy And Lactation Text: This medication is Pregnancy Category D and is known to cause fetal risk.  It is also excreted in breast milk.
Benzoyl Peroxide Pregnancy And Lactation Text: This medication is Pregnancy Category C. It is unknown if benzoyl peroxide is excreted in breast milk.
Topical Sulfur Applications Counseling: Topical Sulfur Counseling: Patient counseled that this medication may cause skin irritation or allergic reactions.  In the event of skin irritation, the patient was advised to reduce the amount of the drug applied or use it less frequently.   The patient verbalized understanding of the proper use and possible adverse effects of topical sulfur application.  All of the patient's questions and concerns were addressed.
Erythromycin Counseling:  I discussed with the patient the risks of erythromycin including but not limited to GI upset, allergic reaction, drug rash, diarrhea, increase in liver enzymes, and yeast infections.
Topical Sulfur Applications Pregnancy And Lactation Text: This medication is Pregnancy Category C and has an unknown safety profile during pregnancy. It is unknown if this topical medication is excreted in breast milk.
Topical Retinoid counseling:  Patient advised to apply a pea-sized amount only at bedtime and wait 30 minutes after washing their face before applying.  If too drying, patient may add a non-comedogenic moisturizer. The patient verbalized understanding of the proper use and possible adverse effects of retinoids.  All of the patient's questions and concerns were addressed.
Erythromycin Pregnancy And Lactation Text: This medication is Pregnancy Category B and is considered safe during pregnancy. It is also excreted in breast milk.
Aklief Pregnancy And Lactation Text: It is unknown if this medication is safe to use during pregnancy.  It is unknown if this medication is excreted in breast milk.  Breastfeeding women should use the topical cream on the smallest area of the skin for the shortest time needed while breastfeeding.  Do not apply to nipple and areola.
Tazorac Counseling:  Patient advised that medication is irritating and drying.  Patient may need to apply sparingly and wash off after an hour before eventually leaving it on overnight.  The patient verbalized understanding of the proper use and possible adverse effects of tazorac.  All of the patient's questions and concerns were addressed.
Sarecycline Counseling: Patient advised regarding possible photosensitivity and discoloration of the teeth, skin, lips, tongue and gums.  Patient instructed to avoid sunlight, if possible.  When exposed to sunlight, patients should wear protective clothing, sunglasses, and sunscreen.  The patient was instructed to call the office immediately if the following severe adverse effects occur:  hearing changes, easy bruising/bleeding, severe headache, or vision changes.  The patient verbalized understanding of the proper use and possible adverse effects of sarecycline.  All of the patient's questions and concerns were addressed.
Dapsone Counseling: I discussed with the patient the risks of dapsone including but not limited to hemolytic anemia, agranulocytosis, rashes, methemoglobinemia, kidney failure, peripheral neuropathy, headaches, GI upset, and liver toxicity.  Patients who start dapsone require monitoring including baseline LFTs and weekly CBCs for the first month, then every month thereafter.  The patient verbalized understanding of the proper use and possible adverse effects of dapsone.  All of the patient's questions and concerns were addressed.
Isotretinoin Pregnancy And Lactation Text: This medication is Pregnancy Category X and is considered extremely dangerous during pregnancy. It is unknown if it is excreted in breast milk.
Winlevi Pregnancy And Lactation Text: This medication is considered safe during pregnancy and breastfeeding.
Spironolactone Pregnancy And Lactation Text: This medication can cause feminization of the male fetus and should be avoided during pregnancy. The active metabolite is also found in breast milk.
Topical Clindamycin Pregnancy And Lactation Text: This medication is Pregnancy Category B and is considered safe during pregnancy. It is unknown if it is excreted in breast milk.
Benzoyl Peroxide Counseling: Patient counseled that medicine may cause skin irritation and bleach clothing.  In the event of skin irritation, the patient was advised to reduce the amount of the drug applied or use it less frequently.   The patient verbalized understanding of the proper use and possible adverse effects of benzoyl peroxide.  All of the patient's questions and concerns were addressed.
Bactrim Counseling:  I discussed with the patient the risks of sulfa antibiotics including but not limited to GI upset, allergic reaction, drug rash, diarrhea, dizziness, photosensitivity, and yeast infections.  Rarely, more serious reactions can occur including but not limited to aplastic anemia, agranulocytosis, methemoglobinemia, blood dyscrasias, liver or kidney failure, lung infiltrates or desquamative/blistering drug rashes.
Minocycline Counseling: Patient advised regarding possible photosensitivity and discoloration of the teeth, skin, lips, tongue and gums.  Patient instructed to avoid sunlight, if possible.  When exposed to sunlight, patients should wear protective clothing, sunglasses, and sunscreen.  The patient was instructed to call the office immediately if the following severe adverse effects occur:  hearing changes, easy bruising/bleeding, severe headache, or vision changes.  The patient verbalized understanding of the proper use and possible adverse effects of minocycline.  All of the patient's questions and concerns were addressed.
Doxycycline Pregnancy And Lactation Text: This medication is Pregnancy Category D and not consider safe during pregnancy. It is also excreted in breast milk but is considered safe for shorter treatment courses.
Birth Control Pills Counseling: Birth Control Pill Counseling: I discussed with the patient the potential side effects of OCPs including but not limited to increased risk of stroke, heart attack, thrombophlebitis, deep venous thrombosis, hepatic adenomas, breast changes, GI upset, headaches, and depression.  The patient verbalized understanding of the proper use and possible adverse effects of OCPs. All of the patient's questions and concerns were addressed.